# Patient Record
Sex: FEMALE | Race: BLACK OR AFRICAN AMERICAN | NOT HISPANIC OR LATINO | URBAN - METROPOLITAN AREA
[De-identification: names, ages, dates, MRNs, and addresses within clinical notes are randomized per-mention and may not be internally consistent; named-entity substitution may affect disease eponyms.]

---

## 2017-01-25 ENCOUNTER — EMERGENCY (EMERGENCY)
Facility: HOSPITAL | Age: 22
LOS: 1 days | Discharge: HOME | End: 2017-01-25
Admitting: EMERGENCY MEDICINE
Payer: MEDICAID

## 2017-01-25 DIAGNOSIS — Z98.89 OTHER SPECIFIED POSTPROCEDURAL STATES: Chronic | ICD-10-CM

## 2017-01-25 DIAGNOSIS — M75.100 UNSPECIFIED ROTATOR CUFF TEAR OR RUPTURE OF UNSPECIFIED SHOULDER, NOT SPECIFIED AS TRAUMATIC: Chronic | ICD-10-CM

## 2017-01-25 PROCEDURE — 76856 US EXAM PELVIC COMPLETE: CPT | Mod: 26

## 2017-01-25 PROCEDURE — 76830 TRANSVAGINAL US NON-OB: CPT | Mod: 26

## 2017-01-25 PROCEDURE — 99285 EMERGENCY DEPT VISIT HI MDM: CPT

## 2017-01-26 PROCEDURE — 85027 COMPLETE CBC AUTOMATED: CPT

## 2017-01-26 PROCEDURE — 36415 COLL VENOUS BLD VENIPUNCTURE: CPT

## 2017-01-26 PROCEDURE — 81003 URINALYSIS AUTO W/O SCOPE: CPT

## 2017-01-26 PROCEDURE — 87491 CHLMYD TRACH DNA AMP PROBE: CPT

## 2017-01-26 PROCEDURE — 87070 CULTURE OTHR SPECIMN AEROBIC: CPT

## 2017-01-26 PROCEDURE — 76830 TRANSVAGINAL US NON-OB: CPT

## 2017-01-26 PROCEDURE — 99284 EMERGENCY DEPT VISIT MOD MDM: CPT | Mod: 25

## 2017-01-26 PROCEDURE — 87086 URINE CULTURE/COLONY COUNT: CPT

## 2017-01-26 PROCEDURE — 76856 US EXAM PELVIC COMPLETE: CPT

## 2017-01-26 PROCEDURE — 81025 URINE PREGNANCY TEST: CPT

## 2017-01-26 PROCEDURE — 87591 N.GONORRHOEAE DNA AMP PROB: CPT

## 2017-01-26 PROCEDURE — 96372 THER/PROPH/DIAG INJ SC/IM: CPT

## 2017-03-07 ENCOUNTER — EMERGENCY (EMERGENCY)
Facility: HOSPITAL | Age: 22
LOS: 1 days | Discharge: HOME | End: 2017-03-07
Admitting: EMERGENCY MEDICINE
Payer: MEDICAID

## 2017-03-07 DIAGNOSIS — M75.100 UNSPECIFIED ROTATOR CUFF TEAR OR RUPTURE OF UNSPECIFIED SHOULDER, NOT SPECIFIED AS TRAUMATIC: Chronic | ICD-10-CM

## 2017-03-07 DIAGNOSIS — Z98.89 OTHER SPECIFIED POSTPROCEDURAL STATES: Chronic | ICD-10-CM

## 2017-03-07 PROCEDURE — 99284 EMERGENCY DEPT VISIT MOD MDM: CPT

## 2017-03-07 PROCEDURE — 96365 THER/PROPH/DIAG IV INF INIT: CPT

## 2017-03-07 PROCEDURE — 99284 EMERGENCY DEPT VISIT MOD MDM: CPT | Mod: 25

## 2017-03-21 ENCOUNTER — APPOINTMENT (OUTPATIENT)
Dept: NEUROLOGY | Facility: CLINIC | Age: 22
End: 2017-03-21

## 2017-12-04 ENCOUNTER — TRANSCRIPTION ENCOUNTER (OUTPATIENT)
Age: 22
End: 2017-12-04

## 2018-01-23 ENCOUNTER — APPOINTMENT (OUTPATIENT)
Dept: INTERNAL MEDICINE | Facility: CLINIC | Age: 23
End: 2018-01-23

## 2018-03-09 ENCOUNTER — TRANSCRIPTION ENCOUNTER (OUTPATIENT)
Age: 23
End: 2018-03-09

## 2018-03-09 ENCOUNTER — EMERGENCY (EMERGENCY)
Facility: HOSPITAL | Age: 23
LOS: 1 days | Discharge: HOME | End: 2018-03-09
Attending: EMERGENCY MEDICINE | Admitting: EMERGENCY MEDICINE
Payer: COMMERCIAL

## 2018-03-09 VITALS
TEMPERATURE: 98 F | OXYGEN SATURATION: 100 % | RESPIRATION RATE: 16 BRPM | SYSTOLIC BLOOD PRESSURE: 112 MMHG | DIASTOLIC BLOOD PRESSURE: 68 MMHG | HEART RATE: 94 BPM

## 2018-03-09 DIAGNOSIS — M75.100 UNSPECIFIED ROTATOR CUFF TEAR OR RUPTURE OF UNSPECIFIED SHOULDER, NOT SPECIFIED AS TRAUMATIC: Chronic | ICD-10-CM

## 2018-03-09 DIAGNOSIS — Z98.89 OTHER SPECIFIED POSTPROCEDURAL STATES: Chronic | ICD-10-CM

## 2018-03-09 PROCEDURE — 99285 EMERGENCY DEPT VISIT HI MDM: CPT | Mod: 25

## 2018-03-09 NOTE — ED ADULT TRIAGE NOTE - CHIEF COMPLAINT QUOTE
Pt presents to ED for ingesting approximately half a bottle of acetaminophen and half a bottle of Motrin and 3 pills of Tylenol 3 at approximately 12 pm today. Pt also co bilateral lower back pain and flank pain starting today that started after taking medication. Pt states she has been under increased amount of stress at home lately. Pt denies SI, denies HI, denies other drug or ETOH use, denies PMH. Pt calm and cooperative in triage. LMP 2/21/18. Pt transported to [facility] for constant observation.

## 2018-03-09 NOTE — ED PROVIDER NOTE - CARE PLAN
Principal Discharge DX:	Ingestion of substance  Assessment and plan of treatment:	Follow up with your primary care doctor within 1-2 days. Follow up with your therapist or with [facility]. Do not take acetaminophen or tylenol. Return to Emergency Department for any new, worsening, and/or concerning symptoms including but not limited to severe pain, thoughts of wanting to harm yourself or others.

## 2018-03-09 NOTE — ED PROVIDER NOTE - ATTENDING CONTRIBUTION TO CARE
22F p/w R sided flank pain x 1 day after taking a bunch of medicines around noon in a fit of pique due to a stressful life situation.  Pt somewhat evasive when asked if it was a suicide attempt but says it was not trying to kill herself.  She 12 tabs of 500mg tylenol.  6 tabls of ibu 200, 3 x t#3 in a possible suicide attempt.  Vomited 5 hr later, then 2 hrs later, then started having bilat LBP, more on the right.  Pt c/o nausea currently also.  Pt has history of psych hospitalization in the past.  Will rx NAC pending results of tylenol level and tox eval.  Given RUQ ttp would check RUQ US as well as check general labs, UHCG, UA, give fluids and zofran, reassess.  Potentially toxic ingestion of tylenol.  Psych eval.  VS:  unremarkable    GEN - NAD; malaise; A+O x3   HEAD - NC/AT     ENT - PEERL, EOMI, mucous membranes  dry , no discharge      NECK: Neck supple, non-tender without lymphadenopathy, no masses, no JVD  PULM - CTA b/l,  symmetric breath sounds  COR -  normal heart sounds    ABD - , ND, RUQ ttp, soft, no guarding, no rebound, no masses    BACK - no CVA tenderness, nontender spine     EXTREMS - no edema, no deformity, warm and well perfused    SKIN - no rash or bruising      NEUROLOGIC - alert, CN 2-12 intact, sensation nl, motor 5/5 RUE/LUE/RLE/LLE.

## 2018-03-09 NOTE — ED PROVIDER NOTE - OBJECTIVE STATEMENT
23 yo F presenting after taking approximately 12 tablets of acetaminophen 500 mg, approximately 6 tablets of ibuprofen 200 mg and 3 tablets of tylenol #3 because she was "under a lot of stress" at around 12 pm today. Denies wanting to harm herself at present. States she vomited approximately 5 hours after ingestion, which had "taste of pills" but no pill fragments, then again had NBNB approx 1 hour prior to arrival. Denies nausea at present but states she is having bilateral lower back pain which began about 6 hours post ingestion. 23 yo F presenting after taking approximately 12 tablets of acetaminophen 500 mg, approximately 6 tablets of ibuprofen 200 mg and 3 tablets of tylenol #3 because she was "under a lot of stress" at around 12 pm today. Denies wanting to harm herself at present. States she vomited approximately 5 hours after ingestion, which had "taste of pills" but no pill fragments, then again had NBNB approx 1 hour prior to arrival. Denies nausea at present but states she is having bilateral lower back pain which began about 6 hours post ingestion. LMP 2/21

## 2018-03-09 NOTE — ED PROVIDER NOTE - PROGRESS NOTE DETAILS
Francisco PGY4: Patient reports pain improved. Cleared by psychiatry. Repeat acetaminophen level less than 15. CMP with normal LFTs. Patient given copy of results and info for [facility] crisis center. Stable for discharge. Patient informed of low K. States potassium always low. Declining KCl tablet here, states she will go home and eat bananas.

## 2018-03-09 NOTE — ED PROVIDER NOTE - NOTES
Spoke to Toxicology fellow Dr. Billingsley who stated that given normal LFTs and 12 hour acetaminophen level 22.2, would not need NAC. NAC discontinued.

## 2018-03-09 NOTE — ED PROVIDER NOTE - PLAN OF CARE
Follow up with your primary care doctor within 1-2 days. Follow up with your therapist or with [facility]. Do not take acetaminophen or tylenol. Return to Emergency Department for any new, worsening, and/or concerning symptoms including but not limited to severe pain, thoughts of wanting to harm yourself or others.

## 2018-03-10 VITALS
DIASTOLIC BLOOD PRESSURE: 87 MMHG | SYSTOLIC BLOOD PRESSURE: 116 MMHG | OXYGEN SATURATION: 100 % | RESPIRATION RATE: 18 BRPM | HEART RATE: 78 BPM

## 2018-03-10 DIAGNOSIS — R69 ILLNESS, UNSPECIFIED: ICD-10-CM

## 2018-03-10 DIAGNOSIS — F43.20 ADJUSTMENT DISORDER, UNSPECIFIED: ICD-10-CM

## 2018-03-10 LAB
ALBUMIN SERPL ELPH-MCNC: 3.9 G/DL — SIGNIFICANT CHANGE UP (ref 3.3–5)
ALBUMIN SERPL ELPH-MCNC: 4.4 G/DL — SIGNIFICANT CHANGE UP (ref 3.3–5)
ALP SERPL-CCNC: 39 U/L — LOW (ref 40–120)
ALP SERPL-CCNC: 46 U/L — SIGNIFICANT CHANGE UP (ref 40–120)
ALT FLD-CCNC: 11 U/L — SIGNIFICANT CHANGE UP (ref 4–33)
ALT FLD-CCNC: 9 U/L — SIGNIFICANT CHANGE UP (ref 4–33)
APAP SERPL-MCNC: 22.2 UG/ML — SIGNIFICANT CHANGE UP (ref 15–25)
APAP SERPL-MCNC: < 15 UG/ML — LOW (ref 15–25)
AST SERPL-CCNC: 15 U/L — SIGNIFICANT CHANGE UP (ref 4–32)
AST SERPL-CCNC: 18 U/L — SIGNIFICANT CHANGE UP (ref 4–32)
BARBITURATES MEASUREMENT: NEGATIVE — SIGNIFICANT CHANGE UP
BARBITURATES MEASUREMENT: NEGATIVE — SIGNIFICANT CHANGE UP
BASE EXCESS BLDV CALC-SCNC: 1.5 MMOL/L — SIGNIFICANT CHANGE UP
BASOPHILS # BLD AUTO: 0.01 K/UL — SIGNIFICANT CHANGE UP (ref 0–0.2)
BASOPHILS NFR BLD AUTO: 0.2 % — SIGNIFICANT CHANGE UP (ref 0–2)
BENZODIAZ SERPL-MCNC: NEGATIVE — SIGNIFICANT CHANGE UP
BENZODIAZ SERPL-MCNC: NEGATIVE — SIGNIFICANT CHANGE UP
BILIRUB SERPL-MCNC: 0.9 MG/DL — SIGNIFICANT CHANGE UP (ref 0.2–1.2)
BILIRUB SERPL-MCNC: 1 MG/DL — SIGNIFICANT CHANGE UP (ref 0.2–1.2)
BLOOD GAS VENOUS - CREATININE: 0.71 MG/DL — SIGNIFICANT CHANGE UP (ref 0.5–1.3)
BUN SERPL-MCNC: 10 MG/DL — SIGNIFICANT CHANGE UP (ref 7–23)
BUN SERPL-MCNC: 12 MG/DL — SIGNIFICANT CHANGE UP (ref 7–23)
CALCIUM SERPL-MCNC: 8.2 MG/DL — LOW (ref 8.4–10.5)
CALCIUM SERPL-MCNC: 8.9 MG/DL — SIGNIFICANT CHANGE UP (ref 8.4–10.5)
CHLORIDE BLDV-SCNC: 106 MMOL/L — SIGNIFICANT CHANGE UP (ref 96–108)
CHLORIDE SERPL-SCNC: 105 MMOL/L — SIGNIFICANT CHANGE UP (ref 98–107)
CHLORIDE SERPL-SCNC: 106 MMOL/L — SIGNIFICANT CHANGE UP (ref 98–107)
CK SERPL-CCNC: 133 U/L — SIGNIFICANT CHANGE UP (ref 25–170)
CO2 SERPL-SCNC: 22 MMOL/L — SIGNIFICANT CHANGE UP (ref 22–31)
CO2 SERPL-SCNC: 23 MMOL/L — SIGNIFICANT CHANGE UP (ref 22–31)
CREAT SERPL-MCNC: 0.56 MG/DL — SIGNIFICANT CHANGE UP (ref 0.5–1.3)
CREAT SERPL-MCNC: 0.76 MG/DL — SIGNIFICANT CHANGE UP (ref 0.5–1.3)
EOSINOPHIL # BLD AUTO: 0.05 K/UL — SIGNIFICANT CHANGE UP (ref 0–0.5)
EOSINOPHIL NFR BLD AUTO: 1.1 % — SIGNIFICANT CHANGE UP (ref 0–6)
ETHANOL BLD-MCNC: < 10 MG/DL — SIGNIFICANT CHANGE UP
ETHANOL BLD-MCNC: < 10 MG/DL — SIGNIFICANT CHANGE UP
GAS PNL BLDV: 140 MMOL/L — SIGNIFICANT CHANGE UP (ref 136–146)
GLUCOSE BLDV-MCNC: 94 — SIGNIFICANT CHANGE UP (ref 70–99)
GLUCOSE SERPL-MCNC: 90 MG/DL — SIGNIFICANT CHANGE UP (ref 70–99)
GLUCOSE SERPL-MCNC: 91 MG/DL — SIGNIFICANT CHANGE UP (ref 70–99)
HCO3 BLDV-SCNC: 24 MMOL/L — SIGNIFICANT CHANGE UP (ref 20–27)
HCT VFR BLD CALC: 39.2 % — SIGNIFICANT CHANGE UP (ref 34.5–45)
HCT VFR BLDV CALC: 41.6 % — SIGNIFICANT CHANGE UP (ref 34.5–45)
HGB BLD-MCNC: 13.4 G/DL — SIGNIFICANT CHANGE UP (ref 11.5–15.5)
HGB BLDV-MCNC: 13.6 G/DL — SIGNIFICANT CHANGE UP (ref 11.5–15.5)
IMM GRANULOCYTES # BLD AUTO: 0.01 # — SIGNIFICANT CHANGE UP
IMM GRANULOCYTES NFR BLD AUTO: 0.2 % — SIGNIFICANT CHANGE UP (ref 0–1.5)
LACTATE BLDV-MCNC: 1.5 MMOL/L — SIGNIFICANT CHANGE UP (ref 0.5–2)
LYMPHOCYTES # BLD AUTO: 1.43 K/UL — SIGNIFICANT CHANGE UP (ref 1–3.3)
LYMPHOCYTES # BLD AUTO: 31.2 % — SIGNIFICANT CHANGE UP (ref 13–44)
MAGNESIUM SERPL-MCNC: 2.2 MG/DL — SIGNIFICANT CHANGE UP (ref 1.6–2.6)
MCHC RBC-ENTMCNC: 32.4 PG — SIGNIFICANT CHANGE UP (ref 27–34)
MCHC RBC-ENTMCNC: 34.2 % — SIGNIFICANT CHANGE UP (ref 32–36)
MCV RBC AUTO: 94.7 FL — SIGNIFICANT CHANGE UP (ref 80–100)
MONOCYTES # BLD AUTO: 0.31 K/UL — SIGNIFICANT CHANGE UP (ref 0–0.9)
MONOCYTES NFR BLD AUTO: 6.8 % — SIGNIFICANT CHANGE UP (ref 2–14)
NEUTROPHILS # BLD AUTO: 2.78 K/UL — SIGNIFICANT CHANGE UP (ref 1.8–7.4)
NEUTROPHILS NFR BLD AUTO: 60.5 % — SIGNIFICANT CHANGE UP (ref 43–77)
NRBC # FLD: 0 — SIGNIFICANT CHANGE UP
PCO2 BLDV: 50 MMHG — SIGNIFICANT CHANGE UP (ref 41–51)
PH BLDV: 7.34 PH — SIGNIFICANT CHANGE UP (ref 7.32–7.43)
PHOSPHATE SERPL-MCNC: 3.7 MG/DL — SIGNIFICANT CHANGE UP (ref 2.5–4.5)
PLATELET # BLD AUTO: 214 K/UL — SIGNIFICANT CHANGE UP (ref 150–400)
PMV BLD: 11.2 FL — SIGNIFICANT CHANGE UP (ref 7–13)
PO2 BLDV: 31 MMHG — LOW (ref 35–40)
POTASSIUM BLDV-SCNC: 3.2 MMOL/L — LOW (ref 3.4–4.5)
POTASSIUM SERPL-MCNC: 3.3 MMOL/L — LOW (ref 3.5–5.3)
POTASSIUM SERPL-MCNC: 3.4 MMOL/L — LOW (ref 3.5–5.3)
POTASSIUM SERPL-SCNC: 3.3 MMOL/L — LOW (ref 3.5–5.3)
POTASSIUM SERPL-SCNC: 3.4 MMOL/L — LOW (ref 3.5–5.3)
PROT SERPL-MCNC: 6.9 G/DL — SIGNIFICANT CHANGE UP (ref 6–8.3)
PROT SERPL-MCNC: 7.8 G/DL — SIGNIFICANT CHANGE UP (ref 6–8.3)
RBC # BLD: 4.14 M/UL — SIGNIFICANT CHANGE UP (ref 3.8–5.2)
RBC # FLD: 12.8 % — SIGNIFICANT CHANGE UP (ref 10.3–14.5)
SALICYLATES SERPL-MCNC: < 5 MG/DL — LOW (ref 15–30)
SALICYLATES SERPL-MCNC: < 5 MG/DL — LOW (ref 15–30)
SAO2 % BLDV: 50.7 % — LOW (ref 60–85)
SODIUM SERPL-SCNC: 142 MMOL/L — SIGNIFICANT CHANGE UP (ref 135–145)
SODIUM SERPL-SCNC: 143 MMOL/L — SIGNIFICANT CHANGE UP (ref 135–145)
WBC # BLD: 4.59 K/UL — SIGNIFICANT CHANGE UP (ref 3.8–10.5)
WBC # FLD AUTO: 4.59 K/UL — SIGNIFICANT CHANGE UP (ref 3.8–10.5)

## 2018-03-10 PROCEDURE — 90792 PSYCH DIAG EVAL W/MED SRVCS: CPT

## 2018-03-10 PROCEDURE — 76705 ECHO EXAM OF ABDOMEN: CPT | Mod: 26

## 2018-03-10 RX ORDER — SODIUM CHLORIDE 9 MG/ML
1000 INJECTION INTRAMUSCULAR; INTRAVENOUS; SUBCUTANEOUS ONCE
Qty: 0 | Refills: 0 | Status: COMPLETED | OUTPATIENT
Start: 2018-03-10 | End: 2018-03-10

## 2018-03-10 RX ORDER — ACETYLCYSTEINE 200 MG/ML
10 VIAL (ML) MISCELLANEOUS ONCE
Qty: 0 | Refills: 0 | Status: DISCONTINUED | OUTPATIENT
Start: 2018-03-10 | End: 2018-03-10

## 2018-03-10 RX ORDER — ONDANSETRON 8 MG/1
4 TABLET, FILM COATED ORAL ONCE
Qty: 0 | Refills: 0 | Status: COMPLETED | OUTPATIENT
Start: 2018-03-10 | End: 2018-03-10

## 2018-03-10 RX ORDER — POTASSIUM CHLORIDE 20 MEQ
40 PACKET (EA) ORAL ONCE
Qty: 0 | Refills: 0 | Status: DISCONTINUED | OUTPATIENT
Start: 2018-03-10 | End: 2018-03-10

## 2018-03-10 RX ADMIN — ONDANSETRON 4 MILLIGRAM(S): 8 TABLET, FILM COATED ORAL at 01:27

## 2018-03-10 RX ADMIN — SODIUM CHLORIDE 1000 MILLILITER(S): 9 INJECTION INTRAMUSCULAR; INTRAVENOUS; SUBCUTANEOUS at 01:27

## 2018-03-10 NOTE — ED ADULT NURSE REASSESSMENT NOTE - NS ED NURSE REASSESS COMMENT FT1
pt aox3; in no acute distress at this time VSS, 1:1 in place. Safety maintained. Will continue to monitor/assess

## 2018-03-10 NOTE — ED ADULT NURSE NOTE - OBJECTIVE STATEMENT
pt aox3, reports taking approximately 12 tablets of acetaminophen 500 mg, approximately 6 tablets of ibuprofen 200 mg, and 3 tablets of tylenol #3 around 12 pm today. pt states her reason for taking medications was because she was "under a lot of stress" Reports lower back pain, denies nausea at this time. States she vomited 5 hours after ingestion.  Pt presents in no acute distress at this time, denies thoughts of hurting herself or others.  VSS iv placed in right ac 20g; labs sent. 1:1 in place for safety. Will continue to monitor/assess

## 2018-03-10 NOTE — ED ADULT NURSE NOTE - CHIEF COMPLAINT QUOTE
Pt presents to ED for ingesting approximately half a bottle of acetaminophen and half a bottle of Motrin and 3 pills of Tylenol 3 at approximately 12 pm today. Pt also co bilateral lower back pain and flank pain starting today that started after taking medication. Pt states she has been under increased amount of stress at home lately. Pt denies SI, denies HI, denies other drug or ETOH use, denies PMH. Pt calm and cooperative in triage. LMP 2/21/18. Pt transported to [facility] for constant observation.

## 2018-03-10 NOTE — ED BEHAVIORAL HEALTH ASSESSMENT NOTE - HPI (INCLUDE ILLNESS QUALITY, SEVERITY, DURATION, TIMING, CONTEXT, MODIFYING FACTORS, ASSOCIATED SIGNS AND SYMPTOMS)
23 y/o female domiciled with mother and 1 y/o son, works for [employer], with history of Mood Disorder NOS per CVM, 1 past psych hospitalization at [facility] in 2008 s/p suicide attempt via overdose on Tylenol and Motrin, ED visit in 2012 s/p overdose on 6 pills of Augmentin, recently in therapy, not on psychotropics at this time, self-referred brought in by boyfriend s/p suicidal gesture via overdose on approximately 12 tablets of acetaminophen 500 mg, approximately 6 tablets of ibuprofen 200 mg and 3 tablets of tylenol #3 because she was "under a lot of stress."    On assessment, pt reports taking the above amount of Acetaminophen, Ibuprofen, and Tylenol #3 at 12:00 PM today because of ongoing stress. She denies any acute triggers today. States the overdose was impulsive and she regrets doing it, especially because she has a 1 y/o son. Pt was alone when she did it but immediately contacted her boyfriend after taking the pills to tell him what she did. She asked him to take her to the hospital. States she took the pills as a "cry for help" because of the stress she has been experiencing. She adamantly denies wanting to kill herself. She previously worked in a pharmacy and states she knew this amount of medication wouldn't kill her.   Pt cites school, work, custody travis as her primary stressors. She recently graduated from [employer] and plans to apply to 4 year colleges, but she doesn't know where to apply yet or what she wants to do. States her job is stressful, and it was especially stressful last week because she worked a lot of overtime. She is also involved in a custody travis with her son's father. Pt reports feeling "stressed," but she denies depressed mood or anhedonia. She reports difficulty sleeping the past few nights but reports fair sleep overall. She reports good appetite and good energy. She denies difficulty concentrating. She denies anhedonia, denies hopelessness. She adamantly denies suicidal ideation, intent, or plan. She denies homicidal or violent ideation, intent, or plan. She denies manic or psychotic symptoms. She reports occasional alcohol use. She denies illicit drug use.  Pt was seeing a therapist but hasn't seen her in 3 weeks because of pt's work schedule. Pt found therapy helpful and wants to resume. She plans to schedule an appointment with a therapist (Tiffanie Eckert) she has seen in the past and is available on weekends.     Collateral obtained from pt's boyfriend, Delfino. He corroborates the story pt told writer. After the overdose, she immediately expressed remorse and asked him to take her to the hospital. She denied wanting to kill herself. Pt has not voiced SI/HI. Informant denies acute safety concerns and feels comfortable with pt returning home.

## 2018-03-10 NOTE — ED BEHAVIORAL HEALTH ASSESSMENT NOTE - SUMMARY
23 y/o female domiciled with mother and 1 y/o son, works for [employer], with history of Mood Disorder NOS per CVM, 1 past psych hospitalization at [facility] in 2008 s/p suicide attempt via overdose on Tylenol and Motrin, ED visit in 2012 s/p overdose on 6 pills of Augmentin, recently in therapy, not on psychotropics at this time, self-referred brought in by boyfriend s/p suicidal gesture via overdose on approximately 12 tablets of acetaminophen 500 mg, approximately 6 tablets of ibuprofen 200 mg and 3 tablets of tylenol #3 because she was "under a lot of stress."    Patient impulsively took pills as a "cry for help" in the context of psychosocial stressors. Pt expresses remorse and adamantly denies this was a suicide attempt. She denies suicidal ideation, intent, or plan. She is future-oriented and engages in safety planning. She denies homicidal or violent ideation, intent, or plan. She does not appear manic, psychotic, or depressed. Collateral informant denies acute safety concerns and feels comfortable with pt returning home.   Pt does not present an acute danger to self or others, and she does not meet criteria for involuntary psychiatric admission at this time. She declines voluntary psychiatric admission.

## 2018-03-10 NOTE — ED BEHAVIORAL HEALTH ASSESSMENT NOTE - REFERRAL / APPOINTMENT DETAILS
follow-up with therapist, Tiffanie Eckert; pt provided with information for [facility]

## 2018-03-10 NOTE — ED BEHAVIORAL HEALTH ASSESSMENT NOTE - DESCRIPTION
calm, cooperative, in good behavioral control     Vital Signs Last 24 Hrs  T(C): 36.8 (10 Mar 2018 02:56), Max: 36.8 (10 Mar 2018 02:56)  T(F): 98.3 (10 Mar 2018 02:56), Max: 98.3 (10 Mar 2018 02:56)  HR: 81 (10 Mar 2018 02:56) (81 - 94)  BP: 127/71 (10 Mar 2018 02:56) (112/68 - 127/71)  BP(mean): --  RR: 18 (10 Mar 2018 02:56) (16 - 18)  SpO2: 100% (10 Mar 2018 02:56) (100% - 100%) denies see HPI

## 2018-03-10 NOTE — ED BEHAVIORAL HEALTH ASSESSMENT NOTE - DIFFERENTIAL
unspecified adjustment disorder  unspecified mood disorder  unspecified anxiety disorder  borderline traits

## 2018-03-16 ENCOUNTER — OUTPATIENT (OUTPATIENT)
Dept: OUTPATIENT SERVICES | Facility: HOSPITAL | Age: 23
LOS: 1 days | Discharge: STILL ADMITTED OR ADMINISTRATIVE | End: 2018-03-16

## 2018-03-16 DIAGNOSIS — Z98.89 OTHER SPECIFIED POSTPROCEDURAL STATES: Chronic | ICD-10-CM

## 2018-03-16 DIAGNOSIS — M75.100 UNSPECIFIED ROTATOR CUFF TEAR OR RUPTURE OF UNSPECIFIED SHOULDER, NOT SPECIFIED AS TRAUMATIC: Chronic | ICD-10-CM

## 2018-03-16 DIAGNOSIS — F43.20 ADJUSTMENT DISORDER, UNSPECIFIED: ICD-10-CM

## 2018-04-01 ENCOUNTER — OUTPATIENT (OUTPATIENT)
Dept: OUTPATIENT SERVICES | Facility: HOSPITAL | Age: 23
LOS: 1 days | End: 2018-04-01
Payer: MEDICAID

## 2018-04-01 DIAGNOSIS — Z98.89 OTHER SPECIFIED POSTPROCEDURAL STATES: Chronic | ICD-10-CM

## 2018-04-01 DIAGNOSIS — M75.100 UNSPECIFIED ROTATOR CUFF TEAR OR RUPTURE OF UNSPECIFIED SHOULDER, NOT SPECIFIED AS TRAUMATIC: Chronic | ICD-10-CM

## 2018-04-01 PROCEDURE — G9001: CPT

## 2018-04-11 ENCOUNTER — EMERGENCY (EMERGENCY)
Facility: HOSPITAL | Age: 23
LOS: 1 days | Discharge: HOME | End: 2018-04-11
Attending: EMERGENCY MEDICINE | Admitting: EMERGENCY MEDICINE
Payer: COMMERCIAL

## 2018-04-11 VITALS
DIASTOLIC BLOOD PRESSURE: 68 MMHG | OXYGEN SATURATION: 100 % | RESPIRATION RATE: 18 BRPM | HEART RATE: 92 BPM | SYSTOLIC BLOOD PRESSURE: 120 MMHG | TEMPERATURE: 98 F

## 2018-04-11 DIAGNOSIS — Z98.89 OTHER SPECIFIED POSTPROCEDURAL STATES: Chronic | ICD-10-CM

## 2018-04-11 DIAGNOSIS — M75.100 UNSPECIFIED ROTATOR CUFF TEAR OR RUPTURE OF UNSPECIFIED SHOULDER, NOT SPECIFIED AS TRAUMATIC: Chronic | ICD-10-CM

## 2018-04-11 PROCEDURE — 71046 X-RAY EXAM CHEST 2 VIEWS: CPT | Mod: 26

## 2018-04-11 PROCEDURE — 99284 EMERGENCY DEPT VISIT MOD MDM: CPT

## 2018-04-11 RX ORDER — SODIUM CHLORIDE 9 MG/ML
1000 INJECTION INTRAMUSCULAR; INTRAVENOUS; SUBCUTANEOUS ONCE
Qty: 0 | Refills: 0 | Status: COMPLETED | OUTPATIENT
Start: 2018-04-11 | End: 2018-04-11

## 2018-04-11 RX ADMIN — SODIUM CHLORIDE 1000 MILLILITER(S): 9 INJECTION INTRAMUSCULAR; INTRAVENOUS; SUBCUTANEOUS at 13:58

## 2018-04-11 NOTE — ED PROVIDER NOTE - CARE PLAN
Principal Discharge DX:	Palpitations  Secondary Diagnosis:	Chest pain  Secondary Diagnosis:	Wheeze Principal Discharge DX:	Palpitations  Assessment and plan of treatment:	You were seen today for your palpitations.  It is unclear what the cause is at this time.  Avoid caffeine, alcohol, and stay well hydrated.  Be sure to follow up with your primary care physician in 2-3 days for re-evaluation.  RETURN TO THE EMERGENCY DEPARTMENT IMMEDIATELY IF YOU DEVELOP CHEST PAIN, TROUBLE BREATHING, IF YOU PASS OUT, OR FOR ANY OTHER CONCERN.  Secondary Diagnosis:	Chest pain  Secondary Diagnosis:	Wheeze

## 2018-04-11 NOTE — ED PROVIDER NOTE - OBJECTIVE STATEMENT
21 y/o female with c/o palpitations.  Per pt, she developed palpitations last night.  No prior h/o such sx.  Intermittent since that time.  She has left sided chest discomfort radiating to the left shoulder.  Aching in nature.  No h/o dvt/pe, recent long immob / surgery / long travel / leg pain / leg swelling / ocp use.  No fam h/o early cardiac disease.  Denies smoking, htn, hld, dm. 23 y/o female with c/o palpitations.  Per pt, she developed palpitations last night.  No prior h/o such sx.  Intermittent since that time.  She has left sided chest discomfort radiating to the left shoulder.  Aching in nature.  No h/o dvt/pe, recent long immob / surgery / long travel / leg pain / leg swelling / ocp use.  No fam h/o early cardiac disease.  Denies smoking, htn, hld, dm, illegal drug use.

## 2018-04-11 NOTE — ED PROVIDER NOTE - CARDIAC, MLM
Normal rate, regular rhythm.  Heart sounds S1, S2.  No murmurs, rubs or gallops.  TTP over left parasternal region and left anterior shoulder, no rash or crepitus

## 2018-04-11 NOTE — ED PROVIDER NOTE - PROGRESS NOTE DETAILS
MD PEARSON:  Pt declines to wait for her chemistry to result.  Prefers to f/u with her pcp and see cardiologist as outpatient.  I have printed her cbc and cxr results for her.  She agrees to return immediately for any worsening symptoms.

## 2018-04-11 NOTE — ED PROVIDER NOTE - MEDICAL DECISION MAKING DETAILS
23 y/o female with palpitations, chest pain since last night.  EKG wnl, nsr, no st elevations or depressions, no t wave inversions, no prolonged qtc, no heart block, no delta or epsilon waves, no lvh.  Doubt acs as no rf's, doubt pe as low risk by wells perc neg, consider anemia, dehydration, lyte abn, hyper thyroidism, pregnancy, ptx.  Obtain cbc, bmp, tsh, cxr, give ivf bolus, reassess, antic dc with o/p f/u.

## 2018-04-11 NOTE — ED ADULT NURSE NOTE - OBJECTIVE STATEMENT
Pt presents to intake room 8, A&Ox3, ambulatory at baseline without assistance, coming in for evaluation of palpitations and shortness of breath since last night. pt woke up with left shoulder pain with numbness and tingling radiating into her fingers.  Denies any chest pain, dizziness, nausea, vomiting, diarrhea, fever, constipation, or chills. IV established in right ac with a 20g, labs drawn and sent, call bell in reach, side rails up, bed in locked position, md evaluation in progress, will continue to monitor. pt allergic to Tegaderm tape, iv secured with paper tape chevron and secured with gauze.

## 2018-04-16 DIAGNOSIS — R69 ILLNESS, UNSPECIFIED: ICD-10-CM

## 2018-05-10 ENCOUNTER — APPOINTMENT (OUTPATIENT)
Dept: ORTHOPEDIC SURGERY | Facility: CLINIC | Age: 23
End: 2018-05-10
Payer: COMMERCIAL

## 2018-05-10 VITALS
HEIGHT: 65 IN | HEART RATE: 93 BPM | DIASTOLIC BLOOD PRESSURE: 83 MMHG | WEIGHT: 138 LBS | SYSTOLIC BLOOD PRESSURE: 117 MMHG | BODY MASS INDEX: 22.99 KG/M2

## 2018-05-10 DIAGNOSIS — Z86.79 PERSONAL HISTORY OF OTHER DISEASES OF THE CIRCULATORY SYSTEM: ICD-10-CM

## 2018-05-10 DIAGNOSIS — Z78.9 OTHER SPECIFIED HEALTH STATUS: ICD-10-CM

## 2018-05-10 DIAGNOSIS — Z87.09 PERSONAL HISTORY OF OTHER DISEASES OF THE RESPIRATORY SYSTEM: ICD-10-CM

## 2018-05-10 PROCEDURE — 72100 X-RAY EXAM L-S SPINE 2/3 VWS: CPT

## 2018-05-10 PROCEDURE — 99204 OFFICE O/P NEW MOD 45 MIN: CPT

## 2018-06-05 ENCOUNTER — APPOINTMENT (OUTPATIENT)
Dept: ORTHOPEDIC SURGERY | Facility: CLINIC | Age: 23
End: 2018-06-05
Payer: COMMERCIAL

## 2018-06-05 ENCOUNTER — APPOINTMENT (OUTPATIENT)
Dept: SURGICAL ONCOLOGY | Facility: CLINIC | Age: 23
End: 2018-06-05
Payer: COMMERCIAL

## 2018-06-05 VITALS
SYSTOLIC BLOOD PRESSURE: 115 MMHG | DIASTOLIC BLOOD PRESSURE: 73 MMHG | RESPIRATION RATE: 16 BRPM | HEIGHT: 65 IN | WEIGHT: 138 LBS | HEART RATE: 98 BPM | OXYGEN SATURATION: 99 % | BODY MASS INDEX: 22.99 KG/M2

## 2018-06-05 DIAGNOSIS — G89.29 LUMBAGO WITH SCIATICA, LEFT SIDE: ICD-10-CM

## 2018-06-05 DIAGNOSIS — M54.42 LUMBAGO WITH SCIATICA, LEFT SIDE: ICD-10-CM

## 2018-06-05 DIAGNOSIS — M54.16 RADICULOPATHY, LUMBAR REGION: ICD-10-CM

## 2018-06-05 PROCEDURE — 99204 OFFICE O/P NEW MOD 45 MIN: CPT

## 2018-06-05 PROCEDURE — 99214 OFFICE O/P EST MOD 30 MIN: CPT

## 2018-06-26 ENCOUNTER — APPOINTMENT (OUTPATIENT)
Dept: ORTHOPEDIC SURGERY | Facility: CLINIC | Age: 23
End: 2018-06-26

## 2018-08-06 ENCOUNTER — INPATIENT (INPATIENT)
Facility: HOSPITAL | Age: 23
LOS: 2 days | Discharge: HOME | End: 2018-08-09
Attending: PSYCHIATRY & NEUROLOGY | Admitting: PSYCHIATRY & NEUROLOGY
Payer: COMMERCIAL

## 2018-08-06 VITALS
SYSTOLIC BLOOD PRESSURE: 137 MMHG | RESPIRATION RATE: 18 BRPM | HEART RATE: 120 BPM | OXYGEN SATURATION: 100 % | TEMPERATURE: 99 F | DIASTOLIC BLOOD PRESSURE: 95 MMHG

## 2018-08-06 DIAGNOSIS — Z98.89 OTHER SPECIFIED POSTPROCEDURAL STATES: Chronic | ICD-10-CM

## 2018-08-06 DIAGNOSIS — F32.9 MAJOR DEPRESSIVE DISORDER, SINGLE EPISODE, UNSPECIFIED: ICD-10-CM

## 2018-08-06 DIAGNOSIS — F33.2 MAJOR DEPRESSIVE DISORDER, RECURRENT SEVERE WITHOUT PSYCHOTIC FEATURES: ICD-10-CM

## 2018-08-06 DIAGNOSIS — M75.100 UNSPECIFIED ROTATOR CUFF TEAR OR RUPTURE OF UNSPECIFIED SHOULDER, NOT SPECIFIED AS TRAUMATIC: Chronic | ICD-10-CM

## 2018-08-06 LAB
ALBUMIN SERPL ELPH-MCNC: 4.3 G/DL — SIGNIFICANT CHANGE UP (ref 3.3–5)
ALP SERPL-CCNC: 48 U/L — SIGNIFICANT CHANGE UP (ref 40–120)
ALT FLD-CCNC: 11 U/L — SIGNIFICANT CHANGE UP (ref 4–33)
AMPHET UR-MCNC: NEGATIVE — SIGNIFICANT CHANGE UP
APAP SERPL-MCNC: < 15 UG/ML — LOW (ref 15–25)
APPEARANCE UR: CLEAR — SIGNIFICANT CHANGE UP
AST SERPL-CCNC: 23 U/L — SIGNIFICANT CHANGE UP (ref 4–32)
BARBITURATES UR SCN-MCNC: NEGATIVE — SIGNIFICANT CHANGE UP
BASOPHILS # BLD AUTO: 0.01 K/UL — SIGNIFICANT CHANGE UP (ref 0–0.2)
BASOPHILS NFR BLD AUTO: 0.2 % — SIGNIFICANT CHANGE UP (ref 0–2)
BENZODIAZ UR-MCNC: NEGATIVE — SIGNIFICANT CHANGE UP
BILIRUB SERPL-MCNC: 0.5 MG/DL — SIGNIFICANT CHANGE UP (ref 0.2–1.2)
BILIRUB UR-MCNC: NEGATIVE — SIGNIFICANT CHANGE UP
BLOOD UR QL VISUAL: NEGATIVE — SIGNIFICANT CHANGE UP
BUN SERPL-MCNC: 10 MG/DL — SIGNIFICANT CHANGE UP (ref 7–23)
CALCIUM SERPL-MCNC: 10 MG/DL — SIGNIFICANT CHANGE UP (ref 8.4–10.5)
CANNABINOIDS UR-MCNC: NEGATIVE — SIGNIFICANT CHANGE UP
CHLORIDE SERPL-SCNC: 102 MMOL/L — SIGNIFICANT CHANGE UP (ref 98–107)
CO2 SERPL-SCNC: 24 MMOL/L — SIGNIFICANT CHANGE UP (ref 22–31)
COCAINE METAB.OTHER UR-MCNC: NEGATIVE — SIGNIFICANT CHANGE UP
COLOR SPEC: YELLOW — SIGNIFICANT CHANGE UP
CREAT SERPL-MCNC: 0.78 MG/DL — SIGNIFICANT CHANGE UP (ref 0.5–1.3)
EOSINOPHIL # BLD AUTO: 0.05 K/UL — SIGNIFICANT CHANGE UP (ref 0–0.5)
EOSINOPHIL NFR BLD AUTO: 0.9 % — SIGNIFICANT CHANGE UP (ref 0–6)
ETHANOL BLD-MCNC: < 10 MG/DL — SIGNIFICANT CHANGE UP
GLUCOSE SERPL-MCNC: 88 MG/DL — SIGNIFICANT CHANGE UP (ref 70–99)
GLUCOSE UR-MCNC: NEGATIVE — SIGNIFICANT CHANGE UP
HCG SERPL-ACNC: < 5 MIU/ML — SIGNIFICANT CHANGE UP
HCT VFR BLD CALC: 38.9 % — SIGNIFICANT CHANGE UP (ref 34.5–45)
HGB BLD-MCNC: 12.8 G/DL — SIGNIFICANT CHANGE UP (ref 11.5–15.5)
IMM GRANULOCYTES # BLD AUTO: 0.01 # — SIGNIFICANT CHANGE UP
IMM GRANULOCYTES NFR BLD AUTO: 0.2 % — SIGNIFICANT CHANGE UP (ref 0–1.5)
KETONES UR-MCNC: SIGNIFICANT CHANGE UP
LEUKOCYTE ESTERASE UR-ACNC: NEGATIVE — SIGNIFICANT CHANGE UP
LYMPHOCYTES # BLD AUTO: 1.12 K/UL — SIGNIFICANT CHANGE UP (ref 1–3.3)
LYMPHOCYTES # BLD AUTO: 20.6 % — SIGNIFICANT CHANGE UP (ref 13–44)
MCHC RBC-ENTMCNC: 30.5 PG — SIGNIFICANT CHANGE UP (ref 27–34)
MCHC RBC-ENTMCNC: 32.9 % — SIGNIFICANT CHANGE UP (ref 32–36)
MCV RBC AUTO: 92.8 FL — SIGNIFICANT CHANGE UP (ref 80–100)
METHADONE UR-MCNC: NEGATIVE — SIGNIFICANT CHANGE UP
MONOCYTES # BLD AUTO: 0.36 K/UL — SIGNIFICANT CHANGE UP (ref 0–0.9)
MONOCYTES NFR BLD AUTO: 6.6 % — SIGNIFICANT CHANGE UP (ref 2–14)
MUCOUS THREADS # UR AUTO: SIGNIFICANT CHANGE UP
NEUTROPHILS # BLD AUTO: 3.9 K/UL — SIGNIFICANT CHANGE UP (ref 1.8–7.4)
NEUTROPHILS NFR BLD AUTO: 71.5 % — SIGNIFICANT CHANGE UP (ref 43–77)
NITRITE UR-MCNC: NEGATIVE — SIGNIFICANT CHANGE UP
NRBC # FLD: 0 — SIGNIFICANT CHANGE UP
OPIATES UR-MCNC: NEGATIVE — SIGNIFICANT CHANGE UP
OXYCODONE UR-MCNC: NEGATIVE — SIGNIFICANT CHANGE UP
PCP UR-MCNC: NEGATIVE — SIGNIFICANT CHANGE UP
PH UR: 7.5 — SIGNIFICANT CHANGE UP (ref 4.6–8)
PLATELET # BLD AUTO: 227 K/UL — SIGNIFICANT CHANGE UP (ref 150–400)
PMV BLD: 11.6 FL — SIGNIFICANT CHANGE UP (ref 7–13)
POTASSIUM SERPL-MCNC: 4.2 MMOL/L — SIGNIFICANT CHANGE UP (ref 3.5–5.3)
POTASSIUM SERPL-SCNC: 4.2 MMOL/L — SIGNIFICANT CHANGE UP (ref 3.5–5.3)
PROT SERPL-MCNC: 8 G/DL — SIGNIFICANT CHANGE UP (ref 6–8.3)
PROT UR-MCNC: 30 MG/DL — HIGH
RBC # BLD: 4.19 M/UL — SIGNIFICANT CHANGE UP (ref 3.8–5.2)
RBC # FLD: 12.3 % — SIGNIFICANT CHANGE UP (ref 10.3–14.5)
RBC CASTS # UR COMP ASSIST: SIGNIFICANT CHANGE UP (ref 0–?)
SALICYLATES SERPL-MCNC: < 5 MG/DL — LOW (ref 15–30)
SODIUM SERPL-SCNC: 139 MMOL/L — SIGNIFICANT CHANGE UP (ref 135–145)
SP GR SPEC: 1.02 — SIGNIFICANT CHANGE UP (ref 1–1.04)
SQUAMOUS # UR AUTO: SIGNIFICANT CHANGE UP
TSH SERPL-MCNC: 0.81 UIU/ML — SIGNIFICANT CHANGE UP (ref 0.27–4.2)
UROBILINOGEN FLD QL: NORMAL MG/DL — SIGNIFICANT CHANGE UP
WBC # BLD: 5.45 K/UL — SIGNIFICANT CHANGE UP (ref 3.8–10.5)
WBC # FLD AUTO: 5.45 K/UL — SIGNIFICANT CHANGE UP (ref 3.8–10.5)
WBC UR QL: HIGH (ref 0–?)

## 2018-08-06 PROCEDURE — 99285 EMERGENCY DEPT VISIT HI MDM: CPT

## 2018-08-06 RX ORDER — DIPHENHYDRAMINE HCL 50 MG
25 CAPSULE ORAL ONCE
Qty: 0 | Refills: 0 | Status: COMPLETED | OUTPATIENT
Start: 2018-08-06 | End: 2018-08-06

## 2018-08-06 RX ORDER — HYDROXYZINE HCL 10 MG
25 TABLET ORAL EVERY 8 HOURS
Qty: 0 | Refills: 0 | Status: DISCONTINUED | OUTPATIENT
Start: 2018-08-06 | End: 2018-08-09

## 2018-08-06 RX ORDER — DIPHENHYDRAMINE HCL 50 MG
50 CAPSULE ORAL AT BEDTIME
Qty: 0 | Refills: 0 | Status: DISCONTINUED | OUTPATIENT
Start: 2018-08-06 | End: 2018-08-09

## 2018-08-06 RX ADMIN — Medication 25 MILLIGRAM(S): at 23:58

## 2018-08-06 RX ADMIN — Medication 2 MILLIGRAM(S): at 23:58

## 2018-08-06 NOTE — ED PROVIDER NOTE - MUSCULOSKELETAL, MLM
Spine appears normal, range of motion is not limited, no muscle or joint tenderness Minimal R foot tenderness, no deformity, no redness/swelling Normal neurovascular exam.

## 2018-08-06 NOTE — ED ADULT NURSE NOTE - OBJECTIVE STATEMENT
Pt received in  c/o SI, denies SI on presentation to , denies HI &AH.  pt denies ETOH and substance use. safety and comfort measures maintained. will continue to monitor.

## 2018-08-06 NOTE — ED BEHAVIORAL HEALTH ASSESSMENT NOTE - SUMMARY
21 y/o  female domiciled with mother and 3 y/o son, employed with [employer] services, with history of Mood Disorder NOS per CVM, 1 past psych hospitalization at [facility] in 2008 s/p suicide attempt via overdose on Tylenol and Motrin, ED visit in 2012 s/p overdose on 6 pills of Augmentin, recently in therapy, not on psychotropics at this time, brought in by EMS after boyfriend activated after patient sent a suicidal text that she was going to overdose and asked him to watch over her son. 21 y/o  female domiciled with mother and 1 y/o son, employed with [employer] services, with history of Mood Disorder NOS per CVM, 1 past psych hospitalization at [facility] in 2008 s/p suicide attempt via overdose on Tylenol and Motrin, ED visit in 2012 s/p overdose on 6 pills of Augmentin, recently in therapy, not on psychotropics at this time, brought in by EMS after boyfriend activated after patient sent a suicidal text in the context of an argument.     Patient denies SI/HI/I/P as well as jaziel and psychosis. Admits to sending a text out of frustration in the midst of an argument. Denies any intent to self harm. Admits to prior impulsive overdoses denies that she wants to do this at this time or is in that state of mind. Cites son as protective factor and is future oriented in wanting to put upcoming court case behind her and make plans to attend nursing school and move out of her home. Patient was offered voluntary admission and declined - she does not meet criteria for involuntary admission. She will be discharged home with a referral to [facility] crisis clinic. 21 y/o  female domiciled with mother and 3 y/o son, employed with [employer] services, with history of Mood Disorder NOS per CVM, 1 past psych hospitalization at [facility] in 2008 s/p suicide attempt via overdose on Tylenol and Motrin, ED visit in 2012 s/p overdose on 6 pills of Augmentin, recently in therapy, not on psychotropics at this time, brought in by EMS after boyfriend activated after patient sent a suicidal text in the context of an argument.     Patient denies SI/HI/I/P as well as jaziel and psychosis. She is guarded and minimizing the details of what brought her to the ED today. She reports a text was sent to one person, however, family reports it was three people that received her suicidal text. She has limited supports. She is going through a custody travis. She has an upcoming court case. She is not currently in treatment. she has a history of multiple prior overdoses (most recently known was 3/2018 and she did not follow up with consistent outpatient treatment). Patient was encouraged and offered voluntary admission and refused. At this time, patient is at high imminent risk of self harm and requires inpatient admission for safety and stabilization.

## 2018-08-06 NOTE — ED ADULT NURSE REASSESSMENT NOTE - NS ED NURSE REASSESS COMMENT FT1
Pt became very agitated when informed about admission plan. Pt yelling, stamping her feet, threatening to hurt somebody, stating "y'all better do something 'cos i'm about to bug the f**k out!!!" "i'm gonna hurt somebody and y'all can treat me however y'all wanna treat me!" "y'all better give me something to calm the f**k down!". Therapeutic communication employed, pt remained agitated, this time cursing at staff. PO meds suggested and pt declined. MD Glass notified. Pt medicated with IM meds for safety and stabilization (see EMR). Report given to KATYA ESTEBAN[address]. pt left  at 00:00 AM.

## 2018-08-06 NOTE — ED BEHAVIORAL HEALTH ASSESSMENT NOTE - DESCRIPTION
calm, cooperative, in good behavioral control     ICU Vital Signs Last 24 Hrs  T(C): 36.9 (06 Aug 2018 20:51), Max: 37 (06 Aug 2018 19:11)  T(F): 98.4 (06 Aug 2018 20:51), Max: 98.6 (06 Aug 2018 19:11)  HR: 89 (06 Aug 2018 20:51) (89 - 120)  BP: 112/74 (06 Aug 2018 20:51) (112/74 - 137/95)  BP(mean): --  ABP: --  ABP(mean): --  RR: 17 (06 Aug 2018 20:51) (17 - 18)  SpO2: 100% (06 Aug 2018 20:51) (100% - 100%) denies see HPI calm, cooperative, however irritable when admission was discussed.     ICU Vital Signs Last 24 Hrs  T(C): 36.9 (06 Aug 2018 20:51), Max: 37 (06 Aug 2018 19:11)  T(F): 98.4 (06 Aug 2018 20:51), Max: 98.6 (06 Aug 2018 19:11)  HR: 89 (06 Aug 2018 20:51) (89 - 120)  BP: 112/74 (06 Aug 2018 20:51) (112/74 - 137/95)  BP(mean): --  ABP: --  ABP(mean): --  RR: 17 (06 Aug 2018 20:51) (17 - 18)  SpO2: 100% (06 Aug 2018 20:51) (100% - 100%)

## 2018-08-06 NOTE — ED BEHAVIORAL HEALTH ASSESSMENT NOTE - PRIMARY DX
Adjustment disorder, unspecified type Deferred condition on axis II MDD (major depressive disorder), recurrent severe, without psychosis
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
## 2018-08-06 NOTE — ED BEHAVIORAL HEALTH ASSESSMENT NOTE - DIFFERENTIAL
unspecified adjustment disorder  unspecified mood disorder  unspecified anxiety disorder  borderline traits adjustment disorder  r/o depressive disorder
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
## 2018-08-06 NOTE — ED BEHAVIORAL HEALTH ASSESSMENT NOTE - PSYCHIATRIC ISSUES AND PLAN (INCLUDE STANDING AND PRN MEDICATION)
will defer medication choice to inpatient team; ativan 2mg IM prn agitation, vistaril prn anxiety, benadryl prn insomnia

## 2018-08-06 NOTE — ED PROVIDER NOTE - OBJECTIVE STATEMENT
22F h/o C section, prior orthopedic problems, on Motrin prn, prior depression and suicidal gestures. Texted a friend today saying she was considering suicide. She denies making any attempt. Denies taking any pills. Her friend called 911 who needed police as pt initially became agitated. On arrival in ED she is cooperative 22F h/o C section, prior orthopedic problems, on Motrin prn, prior depression and suicidal gestures. Texted a friend today saying she was considering suicide. She denies making any attempt. Denies taking any pills. Her friend called 911 who needed police as pt initially became agitated. On arrival in ED she is cooperative. She denies any acute medical symptoms, any drug use. She says her R foot and wrists hurt due to police restraint. Review of Symptoms in all systems otherwise negative, except as indicated

## 2018-08-06 NOTE — ED ADULT TRIAGE NOTE - CHIEF COMPLAINT QUOTE
pt BIBA from home, pt sent a text to kandy anitras "talking to" saying she wants to kill herself.  pt deneis SI and HI at this time.

## 2018-08-06 NOTE — ED BEHAVIORAL HEALTH ASSESSMENT NOTE - RISK ASSESSMENT
pt is not at acutely elevated risk of harm to self or others low to medium. patient has a chronically elevated risk due to prior suicidal gestures, poor frustration tolerance, one prior suicide attempt and prior inpatient admission, ongoing interpersonal issues with stepfather and upcoming court case/custody travis. Protective factors include no violence history, no access to guns, no global insomnia, no substance abuse, supportive family, willingness to seek help, no suicidal ideation or homicidal ideation, hopefulness for future. high risk for self harm at this time, as mentioned above in summary

## 2018-08-06 NOTE — ED PROVIDER NOTE - MEDICAL DECISION MAKING DETAILS
Depression with suicidal ideation. Medical evaluation performed. There is no clinical evidence of intoxication or any acute medical problem requiring immediate intervention. Patient is awaiting psychiatric consultation. Final disposition will be determined by psychiatrist.

## 2018-08-06 NOTE — ED BEHAVIORAL HEALTH ASSESSMENT NOTE - REFERRAL / APPOINTMENT DETAILS
follow-up with therapist, Tiffanie Eckert; pt provided with information for [facility] pt provided with information for [facility]
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
## 2018-08-06 NOTE — ED PROVIDER NOTE - CARE PLAN
Principal Discharge DX:	Depression  Secondary Diagnosis:	Suicidal ideation Principal Discharge DX:	Major depression  Secondary Diagnosis:	Suicidal ideation

## 2018-08-06 NOTE — ED ADULT NURSE NOTE - NSIMPLEMENTINTERV_GEN_ALL_ED
Implemented All Universal Safety Interventions:  [place] to call system. Call bell, personal items and telephone within reach. Instruct patient to call for assistance. Room bathroom lighting operational. Non-slip footwear when patient is off stretcher. Physically safe environment: no spills, clutter or unnecessary equipment. Stretcher in lowest position, wheels locked, appropriate side rails in place.
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
## 2018-08-07 PROCEDURE — 99223 1ST HOSP IP/OBS HIGH 75: CPT

## 2018-08-07 RX ORDER — IBUPROFEN 200 MG
400 TABLET ORAL EVERY 6 HOURS
Qty: 0 | Refills: 0 | Status: DISCONTINUED | OUTPATIENT
Start: 2018-08-07 | End: 2018-08-09

## 2018-08-07 RX ORDER — IBUPROFEN 200 MG
600 TABLET ORAL ONCE
Qty: 0 | Refills: 0 | Status: COMPLETED | OUTPATIENT
Start: 2018-08-07 | End: 2018-08-07

## 2018-08-07 RX ORDER — IBUPROFEN 200 MG
400 TABLET ORAL EVERY 6 HOURS
Qty: 0 | Refills: 0 | Status: DISCONTINUED | OUTPATIENT
Start: 2018-08-07 | End: 2018-08-07

## 2018-08-07 RX ORDER — IBUPROFEN 200 MG
400 TABLET ORAL ONCE
Qty: 0 | Refills: 0 | Status: COMPLETED | OUTPATIENT
Start: 2018-08-07 | End: 2018-08-07

## 2018-08-07 RX ADMIN — Medication 600 MILLIGRAM(S): at 02:05

## 2018-08-07 RX ADMIN — Medication 400 MILLIGRAM(S): at 22:39

## 2018-08-07 RX ADMIN — Medication 400 MILLIGRAM(S): at 23:10

## 2018-08-07 RX ADMIN — Medication 600 MILLIGRAM(S): at 01:23

## 2018-08-07 RX ADMIN — Medication 400 MILLIGRAM(S): at 15:45

## 2018-08-07 RX ADMIN — Medication 400 MILLIGRAM(S): at 14:33
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
## 2018-08-08 VITALS — RESPIRATION RATE: 18 BRPM | TEMPERATURE: 98 F

## 2018-08-08 PROCEDURE — 99232 SBSQ HOSP IP/OBS MODERATE 35: CPT

## 2018-08-08 PROCEDURE — 73630 X-RAY EXAM OF FOOT: CPT | Mod: 26,RT

## 2018-08-08 RX ADMIN — Medication 400 MILLIGRAM(S): at 16:14

## 2018-08-09 PROCEDURE — 99239 HOSP IP/OBS DSCHRG MGMT >30: CPT

## 2018-08-14 ENCOUNTER — OUTPATIENT (OUTPATIENT)
Dept: OUTPATIENT SERVICES | Facility: HOSPITAL | Age: 23
LOS: 1 days | Discharge: HOME | End: 2018-08-14

## 2018-08-14 DIAGNOSIS — M75.100 UNSPECIFIED ROTATOR CUFF TEAR OR RUPTURE OF UNSPECIFIED SHOULDER, NOT SPECIFIED AS TRAUMATIC: Chronic | ICD-10-CM

## 2018-08-14 DIAGNOSIS — Z98.89 OTHER SPECIFIED POSTPROCEDURAL STATES: Chronic | ICD-10-CM

## 2018-08-15 DIAGNOSIS — F43.29 ADJUSTMENT DISORDER WITH OTHER SYMPTOMS: ICD-10-CM

## 2019-01-13 ENCOUNTER — FORM ENCOUNTER (OUTPATIENT)
Age: 24
End: 2019-01-13

## 2019-01-14 ENCOUNTER — APPOINTMENT (OUTPATIENT)
Dept: RADIOLOGY | Facility: CLINIC | Age: 24
End: 2019-01-14
Payer: COMMERCIAL

## 2019-01-14 ENCOUNTER — OUTPATIENT (OUTPATIENT)
Dept: OUTPATIENT SERVICES | Facility: HOSPITAL | Age: 24
LOS: 1 days | End: 2019-01-14

## 2019-01-14 ENCOUNTER — APPOINTMENT (OUTPATIENT)
Dept: ORTHOPEDIC SURGERY | Facility: CLINIC | Age: 24
End: 2019-01-14
Payer: COMMERCIAL

## 2019-01-14 VITALS — HEIGHT: 65 IN | WEIGHT: 139 LBS | BODY MASS INDEX: 23.16 KG/M2

## 2019-01-14 DIAGNOSIS — Z78.9 OTHER SPECIFIED HEALTH STATUS: ICD-10-CM

## 2019-01-14 DIAGNOSIS — Z80.3 FAMILY HISTORY OF MALIGNANT NEOPLASM OF BREAST: ICD-10-CM

## 2019-01-14 DIAGNOSIS — Z56.0 UNEMPLOYMENT, UNSPECIFIED: ICD-10-CM

## 2019-01-14 DIAGNOSIS — M75.100 UNSPECIFIED ROTATOR CUFF TEAR OR RUPTURE OF UNSPECIFIED SHOULDER, NOT SPECIFIED AS TRAUMATIC: Chronic | ICD-10-CM

## 2019-01-14 DIAGNOSIS — Z98.89 OTHER SPECIFIED POSTPROCEDURAL STATES: Chronic | ICD-10-CM

## 2019-01-14 DIAGNOSIS — Z86.79 PERSONAL HISTORY OF OTHER DISEASES OF THE CIRCULATORY SYSTEM: ICD-10-CM

## 2019-01-14 DIAGNOSIS — Z82.61 FAMILY HISTORY OF ARTHRITIS: ICD-10-CM

## 2019-01-14 DIAGNOSIS — Z98.890 OTHER SPECIFIED POSTPROCEDURAL STATES: ICD-10-CM

## 2019-01-14 PROCEDURE — 73630 X-RAY EXAM OF FOOT: CPT | Mod: 26,50

## 2019-01-14 PROCEDURE — 73610 X-RAY EXAM OF ANKLE: CPT | Mod: 26,RT

## 2019-01-14 PROCEDURE — 99213 OFFICE O/P EST LOW 20 MIN: CPT

## 2019-01-14 RX ORDER — ACYCLOVIR 50 MG/G
5 OINTMENT TOPICAL
Qty: 30 | Refills: 0 | Status: DISCONTINUED | COMMUNITY
Start: 2017-07-24 | End: 2019-01-14

## 2019-01-14 RX ORDER — ERGOCALCIFEROL 1.25 MG/1
1.25 MG CAPSULE, LIQUID FILLED ORAL
Qty: 4 | Refills: 0 | Status: DISCONTINUED | COMMUNITY
Start: 2017-07-17 | End: 2019-01-14

## 2019-01-14 RX ORDER — MULTIVITAMIN
TABLET ORAL
Refills: 0 | Status: DISCONTINUED | COMMUNITY
End: 2019-01-14

## 2019-01-14 SDOH — ECONOMIC STABILITY - INCOME SECURITY: UNEMPLOYMENT, UNSPECIFIED: Z56.0

## 2019-01-24 ENCOUNTER — EMERGENCY (EMERGENCY)
Facility: HOSPITAL | Age: 24
LOS: 1 days | Discharge: HOME | End: 2019-01-24
Admitting: EMERGENCY MEDICINE
Payer: COMMERCIAL

## 2019-01-24 VITALS
DIASTOLIC BLOOD PRESSURE: 81 MMHG | TEMPERATURE: 98 F | HEART RATE: 79 BPM | SYSTOLIC BLOOD PRESSURE: 117 MMHG | OXYGEN SATURATION: 100 % | RESPIRATION RATE: 16 BRPM

## 2019-01-24 VITALS
RESPIRATION RATE: 16 BRPM | SYSTOLIC BLOOD PRESSURE: 113 MMHG | DIASTOLIC BLOOD PRESSURE: 72 MMHG | HEART RATE: 80 BPM | OXYGEN SATURATION: 98 % | TEMPERATURE: 98 F

## 2019-01-24 DIAGNOSIS — M75.100 UNSPECIFIED ROTATOR CUFF TEAR OR RUPTURE OF UNSPECIFIED SHOULDER, NOT SPECIFIED AS TRAUMATIC: Chronic | ICD-10-CM

## 2019-01-24 DIAGNOSIS — Z98.89 OTHER SPECIFIED POSTPROCEDURAL STATES: Chronic | ICD-10-CM

## 2019-01-24 LAB
ALBUMIN SERPL ELPH-MCNC: 4.1 G/DL — SIGNIFICANT CHANGE UP (ref 3.3–5)
ALP SERPL-CCNC: 49 U/L — SIGNIFICANT CHANGE UP (ref 40–120)
ALT FLD-CCNC: 8 U/L — SIGNIFICANT CHANGE UP (ref 4–33)
ANION GAP SERPL CALC-SCNC: 11 MMO/L — SIGNIFICANT CHANGE UP (ref 7–14)
APPEARANCE UR: CLEAR — SIGNIFICANT CHANGE UP
AST SERPL-CCNC: 15 U/L — SIGNIFICANT CHANGE UP (ref 4–32)
BACTERIA # UR AUTO: NEGATIVE — SIGNIFICANT CHANGE UP
BASOPHILS # BLD AUTO: 0.01 K/UL — SIGNIFICANT CHANGE UP (ref 0–0.2)
BASOPHILS NFR BLD AUTO: 0.3 % — SIGNIFICANT CHANGE UP (ref 0–2)
BILIRUB SERPL-MCNC: 0.3 MG/DL — SIGNIFICANT CHANGE UP (ref 0.2–1.2)
BILIRUB UR-MCNC: NEGATIVE — SIGNIFICANT CHANGE UP
BLOOD UR QL VISUAL: NEGATIVE — SIGNIFICANT CHANGE UP
BUN SERPL-MCNC: 12 MG/DL — SIGNIFICANT CHANGE UP (ref 7–23)
CALCIUM SERPL-MCNC: 9.2 MG/DL — SIGNIFICANT CHANGE UP (ref 8.4–10.5)
CHLORIDE SERPL-SCNC: 106 MMOL/L — SIGNIFICANT CHANGE UP (ref 98–107)
CO2 SERPL-SCNC: 24 MMOL/L — SIGNIFICANT CHANGE UP (ref 22–31)
COLOR SPEC: YELLOW — SIGNIFICANT CHANGE UP
CREAT SERPL-MCNC: 0.64 MG/DL — SIGNIFICANT CHANGE UP (ref 0.5–1.3)
EOSINOPHIL # BLD AUTO: 0.07 K/UL — SIGNIFICANT CHANGE UP (ref 0–0.5)
EOSINOPHIL NFR BLD AUTO: 1.9 % — SIGNIFICANT CHANGE UP (ref 0–6)
GLUCOSE SERPL-MCNC: 92 MG/DL — SIGNIFICANT CHANGE UP (ref 70–99)
GLUCOSE UR-MCNC: NEGATIVE — SIGNIFICANT CHANGE UP
HCT VFR BLD CALC: 35.9 % — SIGNIFICANT CHANGE UP (ref 34.5–45)
HGB BLD-MCNC: 11.8 G/DL — SIGNIFICANT CHANGE UP (ref 11.5–15.5)
HYALINE CASTS # UR AUTO: NEGATIVE — SIGNIFICANT CHANGE UP
IMM GRANULOCYTES NFR BLD AUTO: 0.3 % — SIGNIFICANT CHANGE UP (ref 0–1.5)
KETONES UR-MCNC: NEGATIVE — SIGNIFICANT CHANGE UP
LEUKOCYTE ESTERASE UR-ACNC: NEGATIVE — SIGNIFICANT CHANGE UP
LIDOCAIN IGE QN: 35.2 U/L — SIGNIFICANT CHANGE UP (ref 7–60)
LYMPHOCYTES # BLD AUTO: 1.43 K/UL — SIGNIFICANT CHANGE UP (ref 1–3.3)
LYMPHOCYTES # BLD AUTO: 38.5 % — SIGNIFICANT CHANGE UP (ref 13–44)
MCHC RBC-ENTMCNC: 31.1 PG — SIGNIFICANT CHANGE UP (ref 27–34)
MCHC RBC-ENTMCNC: 32.9 % — SIGNIFICANT CHANGE UP (ref 32–36)
MCV RBC AUTO: 94.5 FL — SIGNIFICANT CHANGE UP (ref 80–100)
MONOCYTES # BLD AUTO: 0.34 K/UL — SIGNIFICANT CHANGE UP (ref 0–0.9)
MONOCYTES NFR BLD AUTO: 9.2 % — SIGNIFICANT CHANGE UP (ref 2–14)
NEUTROPHILS # BLD AUTO: 1.85 K/UL — SIGNIFICANT CHANGE UP (ref 1.8–7.4)
NEUTROPHILS NFR BLD AUTO: 49.8 % — SIGNIFICANT CHANGE UP (ref 43–77)
NITRITE UR-MCNC: NEGATIVE — SIGNIFICANT CHANGE UP
NRBC # FLD: 0 K/UL — LOW (ref 25–125)
PH UR: 7.5 — SIGNIFICANT CHANGE UP (ref 5–8)
PLATELET # BLD AUTO: 204 K/UL — SIGNIFICANT CHANGE UP (ref 150–400)
PMV BLD: 11.4 FL — SIGNIFICANT CHANGE UP (ref 7–13)
POTASSIUM SERPL-MCNC: 4.1 MMOL/L — SIGNIFICANT CHANGE UP (ref 3.5–5.3)
POTASSIUM SERPL-SCNC: 4.1 MMOL/L — SIGNIFICANT CHANGE UP (ref 3.5–5.3)
PROT SERPL-MCNC: 6.9 G/DL — SIGNIFICANT CHANGE UP (ref 6–8.3)
PROT UR-MCNC: 20 — SIGNIFICANT CHANGE UP
RBC # BLD: 3.8 M/UL — SIGNIFICANT CHANGE UP (ref 3.8–5.2)
RBC # FLD: 12.9 % — SIGNIFICANT CHANGE UP (ref 10.3–14.5)
RBC CASTS # UR COMP ASSIST: SIGNIFICANT CHANGE UP (ref 0–?)
SODIUM SERPL-SCNC: 141 MMOL/L — SIGNIFICANT CHANGE UP (ref 135–145)
SP GR SPEC: 1.03 — SIGNIFICANT CHANGE UP (ref 1–1.04)
SQUAMOUS # UR AUTO: SIGNIFICANT CHANGE UP
UROBILINOGEN FLD QL: NORMAL — SIGNIFICANT CHANGE UP
WBC # BLD: 3.71 K/UL — LOW (ref 3.8–10.5)
WBC # FLD AUTO: 3.71 K/UL — LOW (ref 3.8–10.5)
WBC UR QL: SIGNIFICANT CHANGE UP (ref 0–?)

## 2019-01-24 PROCEDURE — 74177 CT ABD & PELVIS W/CONTRAST: CPT | Mod: 26

## 2019-01-24 PROCEDURE — 99285 EMERGENCY DEPT VISIT HI MDM: CPT

## 2019-01-24 RX ORDER — DICLOFENAC SODIUM 75 MG/1
1 TABLET, DELAYED RELEASE ORAL
Qty: 10 | Refills: 0
Start: 2019-01-24 | End: 2019-01-28

## 2019-01-24 RX ORDER — SODIUM CHLORIDE 9 MG/ML
1000 INJECTION INTRAMUSCULAR; INTRAVENOUS; SUBCUTANEOUS ONCE
Qty: 0 | Refills: 0 | Status: COMPLETED | OUTPATIENT
Start: 2019-01-24 | End: 2019-01-24

## 2019-01-24 RX ORDER — KETOROLAC TROMETHAMINE 30 MG/ML
30 SYRINGE (ML) INJECTION ONCE
Qty: 0 | Refills: 0 | Status: DISCONTINUED | OUTPATIENT
Start: 2019-01-24 | End: 2019-01-24

## 2019-01-24 RX ORDER — MORPHINE SULFATE 50 MG/1
2 CAPSULE, EXTENDED RELEASE ORAL ONCE
Qty: 0 | Refills: 0 | Status: DISCONTINUED | OUTPATIENT
Start: 2019-01-24 | End: 2019-01-24

## 2019-01-24 RX ADMIN — MORPHINE SULFATE 2 MILLIGRAM(S): 50 CAPSULE, EXTENDED RELEASE ORAL at 13:55

## 2019-01-24 RX ADMIN — SODIUM CHLORIDE 1000 MILLILITER(S): 9 INJECTION INTRAMUSCULAR; INTRAVENOUS; SUBCUTANEOUS at 14:39

## 2019-01-24 RX ADMIN — SODIUM CHLORIDE 2000 MILLILITER(S): 9 INJECTION INTRAMUSCULAR; INTRAVENOUS; SUBCUTANEOUS at 13:39

## 2019-01-24 RX ADMIN — MORPHINE SULFATE 2 MILLIGRAM(S): 50 CAPSULE, EXTENDED RELEASE ORAL at 13:39

## 2019-01-24 RX ADMIN — Medication 30 MILLIGRAM(S): at 15:20

## 2019-01-24 NOTE — ED ADULT NURSE NOTE - OBJECTIVE STATEMENT
PT received into RADS A&Ox4, ambulatory C/O Epigastric ABD pain radiating Left flank. Denies N/V/D, hematuria, fevers, chills. MD evaluated, 20 G IV placed to R AC, labs sent, medicated as ordered. Family at bedside, will continue to monitor for safety and comfort.

## 2019-01-24 NOTE — ED PROVIDER NOTE - OBJECTIVE STATEMENT
22yo female w/ hx of ovarian cysts presented to ED c/o R sided abdominal pain x a couple of hours. Pain began at 11AM while sitting down at work. Described pain as sharp, 20/10, coming in waves, and localized to mid abdominal region. pt. states ate a chicken/avocado sandwich roughly 1 hour before pain began. Pt did not take anything for the pain, nothing makes it better, but movement and touch makes the pain worse. Patient states pain is now radiating to R flank and pelvis, and has come down to a 6/10. Denies prior episodes of this occurring, but admits pain is similar to when her ovarian cysts ruptured. LMP ended 1/23/19. Denies sexual activity for several months.  No N/V, fevers, chills, HA, constipation, diarrhea, changes in urinary habits.

## 2019-01-24 NOTE — ED PROVIDER NOTE - MEDICAL DECISION MAKING DETAILS
24 yo female c/o abdominal pain x a couple of hours.  - possibly acute appendicitis. r/o intraabdominal pathology  -CBC, CMP, lipase, UA, UCG, IV fluids, pain control   - CT a/p

## 2019-01-24 NOTE — ED PROVIDER NOTE - PROGRESS NOTE DETAILS
Dr. Menon: I am administratively signing this document as per hospital policy.  I was available for consultation for this patient.  I did not evaluate the patient, did not have a doctor/patient relationship with the patient, and did not participate in any medical decision making or disposition decisions unless I am specifically named in the chart as having consulted on the patient.

## 2019-01-25 ENCOUNTER — EMERGENCY (EMERGENCY)
Facility: HOSPITAL | Age: 24
LOS: 1 days | Discharge: HOME | End: 2019-01-25
Attending: EMERGENCY MEDICINE | Admitting: EMERGENCY MEDICINE
Payer: COMMERCIAL

## 2019-01-25 VITALS
SYSTOLIC BLOOD PRESSURE: 126 MMHG | RESPIRATION RATE: 16 BRPM | TEMPERATURE: 98 F | OXYGEN SATURATION: 100 % | HEART RATE: 76 BPM | DIASTOLIC BLOOD PRESSURE: 89 MMHG

## 2019-01-25 DIAGNOSIS — Z98.89 OTHER SPECIFIED POSTPROCEDURAL STATES: Chronic | ICD-10-CM

## 2019-01-25 DIAGNOSIS — M75.100 UNSPECIFIED ROTATOR CUFF TEAR OR RUPTURE OF UNSPECIFIED SHOULDER, NOT SPECIFIED AS TRAUMATIC: Chronic | ICD-10-CM

## 2019-01-25 LAB
ALBUMIN SERPL ELPH-MCNC: 4.5 G/DL — SIGNIFICANT CHANGE UP (ref 3.3–5)
ALP SERPL-CCNC: 46 U/L — SIGNIFICANT CHANGE UP (ref 40–120)
ALT FLD-CCNC: 9 U/L — SIGNIFICANT CHANGE UP (ref 4–33)
ANION GAP SERPL CALC-SCNC: 12 MMO/L — SIGNIFICANT CHANGE UP (ref 7–14)
APTT BLD: 33.1 SEC — SIGNIFICANT CHANGE UP (ref 27.5–36.3)
AST SERPL-CCNC: 14 U/L — SIGNIFICANT CHANGE UP (ref 4–32)
BASE EXCESS BLDV CALC-SCNC: 0.6 MMOL/L — SIGNIFICANT CHANGE UP
BASOPHILS # BLD AUTO: 0.01 K/UL — SIGNIFICANT CHANGE UP (ref 0–0.2)
BASOPHILS NFR BLD AUTO: 0.2 % — SIGNIFICANT CHANGE UP (ref 0–2)
BILIRUB SERPL-MCNC: 0.4 MG/DL — SIGNIFICANT CHANGE UP (ref 0.2–1.2)
BLOOD GAS VENOUS - CREATININE: 0.51 MG/DL — SIGNIFICANT CHANGE UP (ref 0.5–1.3)
BUN SERPL-MCNC: 12 MG/DL — SIGNIFICANT CHANGE UP (ref 7–23)
CALCIUM SERPL-MCNC: 9.3 MG/DL — SIGNIFICANT CHANGE UP (ref 8.4–10.5)
CHLORIDE BLDV-SCNC: 115 MMOL/L — HIGH (ref 96–108)
CHLORIDE SERPL-SCNC: 106 MMOL/L — SIGNIFICANT CHANGE UP (ref 98–107)
CO2 SERPL-SCNC: 24 MMOL/L — SIGNIFICANT CHANGE UP (ref 22–31)
CREAT SERPL-MCNC: 0.69 MG/DL — SIGNIFICANT CHANGE UP (ref 0.5–1.3)
EOSINOPHIL # BLD AUTO: 0.09 K/UL — SIGNIFICANT CHANGE UP (ref 0–0.5)
EOSINOPHIL NFR BLD AUTO: 2.2 % — SIGNIFICANT CHANGE UP (ref 0–6)
GAS PNL BLDV: 139 MMOL/L — SIGNIFICANT CHANGE UP (ref 136–146)
GLUCOSE BLDV-MCNC: 85 — SIGNIFICANT CHANGE UP (ref 70–99)
GLUCOSE SERPL-MCNC: 84 MG/DL — SIGNIFICANT CHANGE UP (ref 70–99)
HCG SERPL-ACNC: < 5 MIU/ML — SIGNIFICANT CHANGE UP
HCO3 BLDV-SCNC: 24 MMOL/L — SIGNIFICANT CHANGE UP (ref 20–27)
HCT VFR BLD CALC: 36.4 % — SIGNIFICANT CHANGE UP (ref 34.5–45)
HCT VFR BLDV CALC: 33 % — LOW (ref 34.5–45)
HGB BLD-MCNC: 12 G/DL — SIGNIFICANT CHANGE UP (ref 11.5–15.5)
HGB BLDV-MCNC: 10.7 G/DL — LOW (ref 11.5–15.5)
IMM GRANULOCYTES NFR BLD AUTO: 0.2 % — SIGNIFICANT CHANGE UP (ref 0–1.5)
INR BLD: 1.03 — SIGNIFICANT CHANGE UP (ref 0.88–1.17)
LACTATE BLDV-MCNC: 0.9 MMOL/L — SIGNIFICANT CHANGE UP (ref 0.5–2)
LYMPHOCYTES # BLD AUTO: 1.9 K/UL — SIGNIFICANT CHANGE UP (ref 1–3.3)
LYMPHOCYTES # BLD AUTO: 47.1 % — HIGH (ref 13–44)
MCHC RBC-ENTMCNC: 31 PG — SIGNIFICANT CHANGE UP (ref 27–34)
MCHC RBC-ENTMCNC: 33 % — SIGNIFICANT CHANGE UP (ref 32–36)
MCV RBC AUTO: 94.1 FL — SIGNIFICANT CHANGE UP (ref 80–100)
MONOCYTES # BLD AUTO: 0.23 K/UL — SIGNIFICANT CHANGE UP (ref 0–0.9)
MONOCYTES NFR BLD AUTO: 5.7 % — SIGNIFICANT CHANGE UP (ref 2–14)
NEUTROPHILS # BLD AUTO: 1.79 K/UL — LOW (ref 1.8–7.4)
NEUTROPHILS NFR BLD AUTO: 44.6 % — SIGNIFICANT CHANGE UP (ref 43–77)
NRBC # FLD: 0 K/UL — LOW (ref 25–125)
PCO2 BLDV: 48 MMHG — SIGNIFICANT CHANGE UP (ref 41–51)
PH BLDV: 7.34 PH — SIGNIFICANT CHANGE UP (ref 7.32–7.43)
PLATELET # BLD AUTO: 231 K/UL — SIGNIFICANT CHANGE UP (ref 150–400)
PMV BLD: 11.6 FL — SIGNIFICANT CHANGE UP (ref 7–13)
PO2 BLDV: 35 MMHG — SIGNIFICANT CHANGE UP (ref 35–40)
POTASSIUM BLDV-SCNC: 3.1 MMOL/L — LOW (ref 3.4–4.5)
POTASSIUM SERPL-MCNC: 3.6 MMOL/L — SIGNIFICANT CHANGE UP (ref 3.5–5.3)
POTASSIUM SERPL-SCNC: 3.6 MMOL/L — SIGNIFICANT CHANGE UP (ref 3.5–5.3)
PROT SERPL-MCNC: 7.5 G/DL — SIGNIFICANT CHANGE UP (ref 6–8.3)
PROTHROM AB SERPL-ACNC: 11.7 SEC — SIGNIFICANT CHANGE UP (ref 9.8–13.1)
RBC # BLD: 3.87 M/UL — SIGNIFICANT CHANGE UP (ref 3.8–5.2)
RBC # FLD: 12.5 % — SIGNIFICANT CHANGE UP (ref 10.3–14.5)
SAO2 % BLDV: 59.4 % — LOW (ref 60–85)
SODIUM SERPL-SCNC: 142 MMOL/L — SIGNIFICANT CHANGE UP (ref 135–145)
WBC # BLD: 4.03 K/UL — SIGNIFICANT CHANGE UP (ref 3.8–10.5)
WBC # FLD AUTO: 4.03 K/UL — SIGNIFICANT CHANGE UP (ref 3.8–10.5)

## 2019-01-25 PROCEDURE — 76830 TRANSVAGINAL US NON-OB: CPT | Mod: 26

## 2019-01-25 PROCEDURE — 93975 VASCULAR STUDY: CPT | Mod: 26

## 2019-01-25 PROCEDURE — 99285 EMERGENCY DEPT VISIT HI MDM: CPT | Mod: 25

## 2019-01-25 RX ORDER — KETOROLAC TROMETHAMINE 30 MG/ML
30 SYRINGE (ML) INJECTION ONCE
Qty: 0 | Refills: 0 | Status: DISCONTINUED | OUTPATIENT
Start: 2019-01-25 | End: 2019-01-25

## 2019-01-25 RX ORDER — MORPHINE SULFATE 50 MG/1
4 CAPSULE, EXTENDED RELEASE ORAL ONCE
Qty: 0 | Refills: 0 | Status: DISCONTINUED | OUTPATIENT
Start: 2019-01-25 | End: 2019-01-25

## 2019-01-25 RX ORDER — SODIUM CHLORIDE 9 MG/ML
1000 INJECTION INTRAMUSCULAR; INTRAVENOUS; SUBCUTANEOUS ONCE
Qty: 0 | Refills: 0 | Status: COMPLETED | OUTPATIENT
Start: 2019-01-25 | End: 2019-01-25

## 2019-01-25 RX ORDER — ONDANSETRON 8 MG/1
4 TABLET, FILM COATED ORAL ONCE
Qty: 0 | Refills: 0 | Status: COMPLETED | OUTPATIENT
Start: 2019-01-25 | End: 2019-01-25

## 2019-01-25 RX ADMIN — SODIUM CHLORIDE 1000 MILLILITER(S): 9 INJECTION INTRAMUSCULAR; INTRAVENOUS; SUBCUTANEOUS at 21:03

## 2019-01-25 RX ADMIN — MORPHINE SULFATE 4 MILLIGRAM(S): 50 CAPSULE, EXTENDED RELEASE ORAL at 20:53

## 2019-01-25 RX ADMIN — Medication 30 MILLIGRAM(S): at 23:09

## 2019-01-25 NOTE — ED PROVIDER NOTE - PROGRESS NOTE DETAILS
pt going to US, pain "worse" with nausea. will medicate. OBGYN consulted. AUSTYN FRENCH: Spoke with GYN, states to call after patient return from US. pt in US still. pending read, obgyn consult. Will endorse to DR Terry GYN w pt now. Surgery consulted. Dr Terry aware. pt care transferred. AUSTYN ervin: pt seen by GYN, surg also paged to eval patient given persistent RLQ pain. AUSTYN Graham: GYN saw patient, d/w attending, reports possible that she could have ruptured a cyst however benign internal exam and normal TVUS. Reports to d/c home with NSAIDs for presumed ovarian cyst, and agrees with plan for surgical eval given persistent pain. Pt offered CDU for monitoring, pain meds, and repeat labs. AUSTYN Graham: seen by surgery, does not believe that this is appendicitis. will dc to CDU for serial abdominal exams, repeat labs, meds PRN. pt agrees with plan.

## 2019-01-25 NOTE — ED PROVIDER NOTE - MEDICAL DECISION MAKING DETAILS
plan reviewed prior chart. labs, urine, TVUS, IVF, pain med, and re assess 24 yo F, nonsmoker with PMH of ovarian cyst presents to c/o worsening RLQ pain today a/w nausea. Female appears in pain and discomfort, was seen in ER yesterday with epigastric/mid abd pain, had neg CT scan, today pain is excruciating and radiating to RLQ, seen at OB today, had TVUS w/ no acute finding, and sent to ER for r/o appendicitis, pain is intermittent a/w nausea, no vaginal bleeding, afebrile, concern  right ovarian torsion vs appendicitis. plan reviewed prior chart. labs, urine, TVUS, IVF, pain med, GYN consult and re assess

## 2019-01-25 NOTE — ED ADULT TRIAGE NOTE - CHIEF COMPLAINT QUOTE
c/o mid abdominal pain with radiation to right flank.  (+) nausea, No vomiting or diarrhea, no fever. Was seen at [facility] yesterday for same symptoms and dx corpus luteum cyst and referred to OB.  Patient saw OB today and sent to ED for r/o appendicitis.  PMH: asthma

## 2019-01-25 NOTE — ED PROVIDER NOTE - CONSTITUTIONAL, MLM
normal... Well appearing, well nourished, awake, alert, oriented to person, place, time/situation and in discomfort and pain.

## 2019-01-25 NOTE — ED ADULT NURSE NOTE - OBJECTIVE STATEMENT
Patient is awake, A&O x 4, NAD, was seen in ED yesterday and discharged home, returned to today for worsening of abdominal pain, no relief with OTC Tylenol.  She was seen by her OB/GYN to r/o cyst and advised her pain was not related to her cysts.  Complaining of RLQ pain radiates to pelvic area.  No nausea, vomiting, fever or vaginal discharge/bleeding.

## 2019-01-25 NOTE — ED ADULT NURSE NOTE - CHIEF COMPLAINT QUOTE
c/o mid abdominal pain with radiation to right flank.  (+) nausea, No vomiting or diarrhea, no fever. Was seen at [facility] yesterday for same symptoms and dx corpus luteum cyst and referred to OB.  Patient saw OB today and sent to ED for r/o appendicitis.  PMH: asthma

## 2019-01-25 NOTE — ED PROVIDER NOTE - OBJECTIVE STATEMENT
22 yo F, nonsmoker with PMH of ovarian cyst presents to c/o worsening RLQ pain today a/w nausea. Pt states she was seen in the ED yesterday for pain in mid abdomen, now pain is radiating to RLQ. Reports pain is sharp, sudden onset, intermittent, 10/10, worse with movements. Reports seen by different OBGYN today at the office, had transvaginal US, states it was normal and advised to go to ER to r/o appendicitis. Denies any fever, vomiting, diarrhea, constipation, chest pain, sob, back pain, dysuria, hematuria, vaginal bleeding, recent travel, or any other complaints.

## 2019-01-25 NOTE — ED PROVIDER NOTE - ATTENDING CONTRIBUTION TO CARE
Attending Statement: I have reviewed and agree with all pertinent clinical information, including history and physical exam and agree with treatment plan of the PA, except as noted.  22yo F no sig pmhx pw abdominal pain since yesterday. States pain started epigastric it "Moved to the right lower". Pain now "right side, described as "sharp pain comes in waves" wane/wax intensity, "very uncomfortable" associated with nausea. no vomit. no diarrhea. no dysuria no hematuria no urgency no vag bleeding, LMP 'Just finished" States she is not sexually active, no hx of STD. . Was seen in ED a day ago for same pain, neg ct, referred to OB. Today went to see "a new GYN" had a US "ok" told to go back to ED. Last ate at 2pm.   Vital signs noted. mmm. nontoxic, normal S1-S2 No resp distress. able to speak in full and clear sentences. no wheeze, rales or stridor. soft tender right periumbilical and RLQ, neg Weller, no cvat. pelvic done by AUSTYN DARDEN   plan reviewed prior chart. labs, urine, TVUS, IVF, pain med, and re assess

## 2019-01-25 NOTE — ED ADULT TRIAGE NOTE - TEMPERATURE IN CELSIUS (DEGREES C)
Alert-The patient is alert, awake and responds to voice. The patient is oriented to time, place, and person. The triage nurse is able to obtain subjective information. 36.8

## 2019-01-26 VITALS
DIASTOLIC BLOOD PRESSURE: 67 MMHG | HEART RATE: 77 BPM | SYSTOLIC BLOOD PRESSURE: 106 MMHG | OXYGEN SATURATION: 100 % | RESPIRATION RATE: 14 BRPM | TEMPERATURE: 98 F

## 2019-01-26 LAB
ALBUMIN SERPL ELPH-MCNC: 3.6 G/DL — SIGNIFICANT CHANGE UP (ref 3.3–5)
ALP SERPL-CCNC: 34 U/L — LOW (ref 40–120)
ALT FLD-CCNC: 8 U/L — SIGNIFICANT CHANGE UP (ref 4–33)
ANION GAP SERPL CALC-SCNC: 9 MMO/L — SIGNIFICANT CHANGE UP (ref 7–14)
ANISOCYTOSIS BLD QL: SLIGHT — SIGNIFICANT CHANGE UP
APPEARANCE UR: CLEAR — SIGNIFICANT CHANGE UP
AST SERPL-CCNC: 13 U/L — SIGNIFICANT CHANGE UP (ref 4–32)
BASOPHILS # BLD AUTO: 0.01 K/UL — SIGNIFICANT CHANGE UP (ref 0–0.2)
BASOPHILS NFR BLD AUTO: 0.4 % — SIGNIFICANT CHANGE UP (ref 0–2)
BASOPHILS NFR SPEC: 0 % — SIGNIFICANT CHANGE UP (ref 0–2)
BILIRUB SERPL-MCNC: 0.6 MG/DL — SIGNIFICANT CHANGE UP (ref 0.2–1.2)
BILIRUB UR-MCNC: NEGATIVE — SIGNIFICANT CHANGE UP
BLASTS # FLD: 0 % — SIGNIFICANT CHANGE UP (ref 0–0)
BLOOD UR QL VISUAL: NEGATIVE — SIGNIFICANT CHANGE UP
BUN SERPL-MCNC: 10 MG/DL — SIGNIFICANT CHANGE UP (ref 7–23)
CALCIUM SERPL-MCNC: 8.4 MG/DL — SIGNIFICANT CHANGE UP (ref 8.4–10.5)
CHLORIDE SERPL-SCNC: 108 MMOL/L — HIGH (ref 98–107)
CO2 SERPL-SCNC: 24 MMOL/L — SIGNIFICANT CHANGE UP (ref 22–31)
COLOR SPEC: SIGNIFICANT CHANGE UP
CREAT SERPL-MCNC: 0.63 MG/DL — SIGNIFICANT CHANGE UP (ref 0.5–1.3)
EOSINOPHIL # BLD AUTO: 0.06 K/UL — SIGNIFICANT CHANGE UP (ref 0–0.5)
EOSINOPHIL NFR BLD AUTO: 2.4 % — SIGNIFICANT CHANGE UP (ref 0–6)
EOSINOPHIL NFR FLD: 0 % — SIGNIFICANT CHANGE UP (ref 0–6)
GIANT PLATELETS BLD QL SMEAR: PRESENT — SIGNIFICANT CHANGE UP
GLUCOSE SERPL-MCNC: 90 MG/DL — SIGNIFICANT CHANGE UP (ref 70–99)
GLUCOSE UR-MCNC: NEGATIVE — SIGNIFICANT CHANGE UP
HCT VFR BLD CALC: 34.9 % — SIGNIFICANT CHANGE UP (ref 34.5–45)
HGB BLD-MCNC: 11 G/DL — LOW (ref 11.5–15.5)
IMM GRANULOCYTES NFR BLD AUTO: 0 % — SIGNIFICANT CHANGE UP (ref 0–1.5)
KETONES UR-MCNC: HIGH
LEUKOCYTE ESTERASE UR-ACNC: NEGATIVE — SIGNIFICANT CHANGE UP
LYMPHOCYTES # BLD AUTO: 1.39 K/UL — SIGNIFICANT CHANGE UP (ref 1–3.3)
LYMPHOCYTES # BLD AUTO: 54.7 % — HIGH (ref 13–44)
LYMPHOCYTES NFR SPEC AUTO: 52.7 % — HIGH (ref 13–44)
MACROCYTES BLD QL: SLIGHT — SIGNIFICANT CHANGE UP
MCHC RBC-ENTMCNC: 30.1 PG — SIGNIFICANT CHANGE UP (ref 27–34)
MCHC RBC-ENTMCNC: 31.5 % — LOW (ref 32–36)
MCV RBC AUTO: 95.4 FL — SIGNIFICANT CHANGE UP (ref 80–100)
METAMYELOCYTES # FLD: 0 % — SIGNIFICANT CHANGE UP (ref 0–1)
MONOCYTES # BLD AUTO: 0.17 K/UL — SIGNIFICANT CHANGE UP (ref 0–0.9)
MONOCYTES NFR BLD AUTO: 6.7 % — SIGNIFICANT CHANGE UP (ref 2–14)
MONOCYTES NFR BLD: 8.9 % — SIGNIFICANT CHANGE UP (ref 2–9)
MYELOCYTES NFR BLD: 0 % — SIGNIFICANT CHANGE UP (ref 0–0)
NEUTROPHIL AB SER-ACNC: 33 % — LOW (ref 43–77)
NEUTROPHILS # BLD AUTO: 0.91 K/UL — LOW (ref 1.8–7.4)
NEUTROPHILS NFR BLD AUTO: 35.8 % — LOW (ref 43–77)
NEUTS BAND # BLD: 0 % — SIGNIFICANT CHANGE UP (ref 0–6)
NITRITE UR-MCNC: NEGATIVE — SIGNIFICANT CHANGE UP
NRBC # FLD: 0 K/UL — LOW (ref 25–125)
OTHER - HEMATOLOGY %: 0 — SIGNIFICANT CHANGE UP
PH UR: 6.5 — SIGNIFICANT CHANGE UP (ref 5–8)
PLATELET # BLD AUTO: 203 K/UL — SIGNIFICANT CHANGE UP (ref 150–400)
PLATELET COUNT - ESTIMATE: NORMAL — SIGNIFICANT CHANGE UP
PMV BLD: 11.7 FL — SIGNIFICANT CHANGE UP (ref 7–13)
POTASSIUM SERPL-MCNC: 3.5 MMOL/L — SIGNIFICANT CHANGE UP (ref 3.5–5.3)
POTASSIUM SERPL-SCNC: 3.5 MMOL/L — SIGNIFICANT CHANGE UP (ref 3.5–5.3)
PROMYELOCYTES # FLD: 0 % — SIGNIFICANT CHANGE UP (ref 0–0)
PROT SERPL-MCNC: 6 G/DL — SIGNIFICANT CHANGE UP (ref 6–8.3)
PROT UR-MCNC: NEGATIVE — SIGNIFICANT CHANGE UP
RBC # BLD: 3.66 M/UL — LOW (ref 3.8–5.2)
RBC # FLD: 12.2 % — SIGNIFICANT CHANGE UP (ref 10.3–14.5)
SMUDGE CELLS # BLD: PRESENT — SIGNIFICANT CHANGE UP
SODIUM SERPL-SCNC: 141 MMOL/L — SIGNIFICANT CHANGE UP (ref 135–145)
SP GR SPEC: 1.02 — SIGNIFICANT CHANGE UP (ref 1–1.04)
UROBILINOGEN FLD QL: NORMAL — SIGNIFICANT CHANGE UP
VARIANT LYMPHS # BLD: 4.5 % — SIGNIFICANT CHANGE UP
WBC # BLD: 2.54 K/UL — LOW (ref 3.8–10.5)
WBC # FLD AUTO: 2.54 K/UL — LOW (ref 3.8–10.5)

## 2019-01-26 PROCEDURE — 99236 HOSP IP/OBS SAME DATE HI 85: CPT

## 2019-01-26 PROCEDURE — 76705 ECHO EXAM OF ABDOMEN: CPT | Mod: 26,76

## 2019-01-26 RX ORDER — SUCRALFATE 1 G
1 TABLET ORAL ONCE
Qty: 0 | Refills: 0 | Status: COMPLETED | OUTPATIENT
Start: 2019-01-26 | End: 2019-01-26

## 2019-01-26 RX ORDER — IBUPROFEN 200 MG
400 TABLET ORAL ONCE
Qty: 0 | Refills: 0 | Status: COMPLETED | OUTPATIENT
Start: 2019-01-26 | End: 2019-01-26

## 2019-01-26 RX ORDER — FAMOTIDINE 10 MG/ML
20 INJECTION INTRAVENOUS ONCE
Qty: 0 | Refills: 0 | Status: COMPLETED | OUTPATIENT
Start: 2019-01-26 | End: 2019-01-26

## 2019-01-26 RX ORDER — FAMOTIDINE 10 MG/ML
20 INJECTION INTRAVENOUS ONCE
Qty: 0 | Refills: 0 | Status: DISCONTINUED | OUTPATIENT
Start: 2019-01-26 | End: 2019-01-26

## 2019-01-26 RX ORDER — ACETAMINOPHEN 500 MG
1000 TABLET ORAL ONCE
Qty: 0 | Refills: 0 | Status: COMPLETED | OUTPATIENT
Start: 2019-01-26 | End: 2019-01-26

## 2019-01-26 RX ORDER — MORPHINE SULFATE 50 MG/1
4 CAPSULE, EXTENDED RELEASE ORAL ONCE
Qty: 0 | Refills: 0 | Status: DISCONTINUED | OUTPATIENT
Start: 2019-01-26 | End: 2019-01-26

## 2019-01-26 RX ADMIN — Medication 400 MILLIGRAM(S): at 09:43

## 2019-01-26 RX ADMIN — FAMOTIDINE 20 MILLIGRAM(S): 10 INJECTION INTRAVENOUS at 01:30

## 2019-01-26 RX ADMIN — ONDANSETRON 4 MILLIGRAM(S): 8 TABLET, FILM COATED ORAL at 00:17

## 2019-01-26 RX ADMIN — Medication 400 MILLIGRAM(S): at 11:12

## 2019-01-26 RX ADMIN — Medication 30 MILLILITER(S): at 02:41

## 2019-01-26 RX ADMIN — Medication 400 MILLIGRAM(S): at 01:31

## 2019-01-26 RX ADMIN — Medication 1 GRAM(S): at 02:41

## 2019-01-26 RX ADMIN — Medication 1000 MILLIGRAM(S): at 02:43

## 2019-01-26 NOTE — ED CDU PROVIDER INITIAL DAY NOTE - ATTENDING CONTRIBUTION TO CARE
Attending Statement: I have reviewed and agree with all pertinent clinical information, including history and physical exam and agree with treatment plan of the PA, except as noted.  24yo F no sig pmhx pw abdominal pain since yesterday. States pain started epigastric it "Moved to the right lower". Pain now "right side, described as "sharp pain comes in waves" wane/wax intensity, "very uncomfortable" associated with nausea. no vomit. no diarrhea. no dysuria no hematuria no urgency no vag bleeding, LMP 'Just finished" States she is not sexually active, no hx of STD. . Was seen in ED a day ago for same pain, neg ct, referred to OB. Today went to see "a new GYN" had a US "ok" told to go back to ED. Last ate at 2pm.   Vital signs noted. mmm. nontoxic, normal S1-S2 No resp distress. able to speak in full and clear sentences. no wheeze, rales or stridor. soft tender right periumbilical and RLQ, neg Weller, no cvat. pelvic done by AUSTYN DARDEN  pt evaluated and cleared by GYN and surgery. Placed in CDU  for serial abdominal exam and re assess in am.

## 2019-01-26 NOTE — ED CDU PROVIDER DISPOSITION NOTE - NSFOLLOWUPINSTRUCTIONS_ED_ALL_ED_FT
Advance activity as tolerated.  Continue all previously prescribed medications as directed unless otherwise instructed.  Take Tylenol 650mg (Two 325 mg pills) every 4-6 hours as needed for pain or fevers. Take Motrin (also sold as Advil or Ibuprofen) 400-600 mg (two or three 200 mg over the counter pills) every 8 hours as needed for moderate pain or fevers-- take with food. Follow up with your primary care physician and OB/GYN in 48-72 hours- bring copies of your results.  Return to the ER for worsening or persistent symptoms, including but not limited to worsening/persistent pain, fevers, vomiting, lightheadedness, passing out and/or ANY NEW OR CONCERNING SYMPTOMS. If you have issues obtaining follow up, please call: [phone] to obtain a doctor or specialist who takes your insurance in your area.  You may call [phone] to make an appointment with the internal medicine clinic.

## 2019-01-26 NOTE — ED CDU PROVIDER INITIAL DAY NOTE - OBJECTIVE STATEMENT
22 yo F, nonsmoker with PMH of ovarian cyst presents to c/o worsening RLQ pain today a/w nausea. Pt states she was seen in the ED yesterday for pain in mid abdomen, now pain is radiating to RLQ. Reports pain is sharp, sudden onset, intermittent, 10/10, worse with movements. Reports seen by different OBGYN today at the office, had transvaginal US, states it was normal and advised to go to ER to r/o appendicitis. Denies any fever, vomiting, diarrhea, constipation, chest pain, sob, back pain, dysuria, hematuria, vaginal bleeding, recent travel, or any other complaints.    CDU AUSTYN Asif: Agree with above. 23 year old female, PMHx of ovarian cyst, presents to the ED with persistent abdominal pain. Patient states she had sudden onset periumbilical/epigastric pain yesterday while she was at work. It was severe, stabbing and associated with diaphoresis and nausea. She was see in the ED with negative CT. Today, the pain has continued, but relocated to the right lower quadrant. The pain is 10/10, worse with movements, associated with nausea, one episode of NBNB vomiting. She saw her OB who did a TVUS which was normal and sent her back to the ED to r/o appendicitis. In the ED labs WNL. Pt seen and cleared by GYN and surgery. Pt brought to CDU for pain control, serial abdominal exams, re evaluation/observation.

## 2019-01-26 NOTE — CONSULT NOTE ADULT - ASSESSMENT
24yo  w/ LMP 19 presents to [facility] ED with abdominal pain.  Workup negative for ovarian torsion.  Small possibility of recently ruptured ovarian cyst as there was a right sided cyst noted on CTAP on  which was no longer appreciated on TVUS on , however, there is only trace (likely physiologic) free fluid in the pelvis.  Unclear etiology of abdominal pain, however, there is NO tenderness on pelvic or abdominal exam.      RECOMMENDATIONS:   - NSAIDs/Tylenol for pain control   - f/u w/ private OBGYN as needed   - r/o GI etiology of pain   - pt may benefit from general surgery consult   - NO acute GYN pathology, GYN team signing off, please re-consult if the pt develops new symptoms or findings suggestive of GYN process    Pt seen and discussed w/ Dr. Devlin PGY4 and Dr. Benites service attg  PAULIE Pereira MD PGY2

## 2019-01-26 NOTE — ED CDU PROVIDER DISPOSITION NOTE - CLINICAL COURSE
24 yo F with LMP 1/18/19 and upreg neg who presented with abdominal pain to the ED and sent to the CDU for observation and repeat physical exams since CT abdomen/pelvis was neg for acute appendicitis and transvaginal sono was neg for torsion and only found trace pelvis fluid. Surgery evaluated the pt and recommended sonogram of the appendix and the RUQ both of which were neg except for a 3mm gallbladder wall polyp. The patient's pain resolved and she is aware that she must followup for the 3mm polyp with repeat sonogram and outpt surgery followup.

## 2019-01-26 NOTE — ED CDU PROVIDER INITIAL DAY NOTE - PROGRESS NOTE DETAILS
Patient resting comfortably, no complaints at present. Will continue to monitor and observe, awaiting repeat labs in am. Pt notes improvement in pain.  Unable to visualize appendix on sono.  RUQ sono shows gallbladder polyp.  Pt tolerating PO.  Surgery aware of findings.  Pt medically stable for discharge.  Strict return precautions explained to patient.  Pt to follow up with PMD for reassess and for monitoring of RUQ sono findings.

## 2019-01-26 NOTE — CONSULT NOTE ADULT - SUBJECTIVE AND OBJECTIVE BOX
R2 GYN ED CONSULT NOTE    SUBJECTIVE:    24yo G_P_     Pt denies fever, chills, nausea, vomiting, diarrhea, headache, constipation, dizzyness, syncope, chest pain, palpitations, shortness of breath, dysuria, urgency, frequency, abdominal/pelvic pain, vaginal bleeding/discharge/odor/pain/itching, breast lumps/bumps, dyspareunia.    In ED today    Primary OB/GYN:  OBH:  GYNH: denies hx of fibroids, ov cysts, abnl paps, sti  PMSH:  MEDS: none  ALL: nkda  SOCH: denies t/e/d; safe at home  FAMH: denies fam hx of breast/uterine/ovarian/colon cancer  ROS: negative except per hpi    OBJECTIVE:    VITAL SIGNS:  Vital Signs Last 24 Hrs  T(C): 36.6 (2019 02:33), Max: 36.8 (2019 18:17)  T(F): 97.8 (2019 02:33), Max: 98.2 (2019 18:17)  HR: 59 (2019 02:33) (59 - 76)  BP: 98/66 (2019 02:33) (98/66 - 126/89)  BP(mean): --  RR: 16 (2019 02:33) (16 - 20)  SpO2: 100% (2019 02:33) (100% - 100%)    CAPILLARY BLOOD GLUCOSE            PHYSICAL EXAM:  GEN: NAD, A&Ox3  CV: RRR, no m/g/r  LUNGS: CTAB  ABD: soft, NT, ND, +BS  SPECULUM:  PELVIC:  EXT: WWP, no edema/TTP    LABS:                        12.0   4.03  )-----------( 231      ( 2019 20:57 )             36.4   baso 0.2    eos 2.2    imm gran 0.2    lymph 47.1   mono 5.7    poly 44.6                         11.8   3.71  )-----------( 204      ( 2019 13:40 )             35.9   baso 0.3    eos 1.9    imm gran 0.3    lymph 38.5   mono 9.2    poly 49.8           142  |  106  |  12  ----------------------------<  84  3.6   |  24  |  0.69    Ca    9.3      2019 20:57    TPro  7.5  /  Alb  4.5  /  TBili  0.4  /  DBili  x   /  AST  14  /  ALT  9   /  AlkPhos  46        Urinalysis Basic - ( 2019 00:15 )    Color: LIGHT YELLOW / Appearance: CLEAR / S.018 / pH: 6.5  Gluc: NEGATIVE / Ketone: MODERATE  / Bili: NEGATIVE / Urobili: NORMAL   Blood: NEGATIVE / Protein: NEGATIVE / Nitrite: NEGATIVE   Leuk Esterase: NEGATIVE / RBC: x / WBC x   Sq Epi: x / Non Sq Epi: x / Bacteria: x        RADIOLOGY:    ASSESSMENT:    RECOMMENDATIONS: R2 GYN ED CONSULT NOTE    SUBJECTIVE:    22yo  w/ LMP 19 presents to [facility] ED with abdominal pain.      Pt denies fever, chills, nausea, vomiting, diarrhea, headache, constipation, dizzyness, syncope, chest pain, palpitations, shortness of breath, dysuria, urgency, frequency, abdominal/pelvic pain, vaginal bleeding/discharge/odor/pain/itching, breast lumps/bumps, dyspareunia.    In ED today    Primary OB/GYN:  OBH:  GYNH: denies hx of fibroids, ov cysts, abnl paps, sti  PMSH:  MEDS: none  ALL: nkda  SOCH: denies t/e/d; safe at home  FAMH: denies fam hx of breast/uterine/ovarian/colon cancer  ROS: negative except per hpi    OBJECTIVE:    VITAL SIGNS:  Vital Signs Last 24 Hrs  T(C): 36.6 (2019 02:33), Max: 36.8 (2019 18:17)  T(F): 97.8 (2019 02:33), Max: 98.2 (2019 18:17)  HR: 59 (2019 02:33) (59 - 76)  BP: 98/66 (2019 02:33) (98/66 - 126/89)  BP(mean): --  RR: 16 (2019 02:33) (16 - 20)  SpO2: 100% (2019 02:33) (100% - 100%)    CAPILLARY BLOOD GLUCOSE            PHYSICAL EXAM:  GEN: NAD, A&Ox3  CV: RRR, no m/g/r  LUNGS: CTAB  ABD: soft, NT, ND, +BS  SPECULUM:  PELVIC:  EXT: WWP, no edema/TTP    LABS:                        12.0   4.03  )-----------( 231      ( 2019 20:57 )             36.4   baso 0.2    eos 2.2    imm gran 0.2    lymph 47.1   mono 5.7    poly 44.6                         11.8   3.71  )-----------( 204      ( 2019 13:40 )             35.9   baso 0.3    eos 1.9    imm gran 0.3    lymph 38.5   mono 9.2    poly 49.8       01-25    142  |  106  |  12  ----------------------------<  84  3.6   |  24  |  0.69    Ca    9.3      2019 20:57    TPro  7.5  /  Alb  4.5  /  TBili  0.4  /  DBili  x   /  AST  14  /  ALT  9   /  AlkPhos  46  01-25      Urinalysis Basic - ( 2019 00:15 )    Color: LIGHT YELLOW / Appearance: CLEAR / S.018 / pH: 6.5  Gluc: NEGATIVE / Ketone: MODERATE  / Bili: NEGATIVE / Urobili: NORMAL   Blood: NEGATIVE / Protein: NEGATIVE / Nitrite: NEGATIVE   Leuk Esterase: NEGATIVE / RBC: x / WBC x   Sq Epi: x / Non Sq Epi: x / Bacteria: x        RADIOLOGY:    ASSESSMENT:    RECOMMENDATIONS: R2 GYN ED CONSULT NOTE    SUBJECTIVE:    24yo  w/ LMP 19 presents to [facility] ED with abdominal pain.  Pt states that she was well until yesterday when she was at work and suddenly had severe epigastric pain that radiated to her central pelvis, prompting her to call EMS.  She presented to [facility] ED on  and workup including labs and CTAP were negative for acute illness, so she was discharged to home.  Then on  she says the pain moved to her RLQ and was still severe so she came back to ED.  The pt says the pain is 10/10 and has not been helped by pain meds in the ED including multiple pushes of IV morphine and toradol.  She never took pain meds at home.  She says the pain is slightly improved with she lays supine but is worsened by curling into a ball/sitting upright when it becomes accompanied by nausea.  Otherwise she denies N/V and has been able to tolerate po including soup the night of  and a small snack the afternoon of .  The pt is able to ambulate w/o difficulty.  She is having normal well formed BM but she says that her last BM on  aggravated the RLQ pain.  She is passing flatus.  She reports normal urination w/o dysuria.  She denies fevers/chills/SOB/chest pain/palpitations.  She reports a history of ruptured ovarian cyst several years ago and says this pain now feels similar to that.  Of note, the pt's history is signficant for multiple ED visits in the last few years including:    - 2018 for suicidal ideation   - 2018 for suicidal ideation   - 2018 for palpitations   - 3/9/2018 for suicide attempt w/ tylenol overdose   - 2016 for back pain   - 2016 for chest pain/dyspnea   - 2016 for toothache    Primary OB/GYN: Belen Lopez  OBH: surgical TOPx2 (, ), pLTCS  (unknown cause)  GYNH: denies hx of fibroids, abnl paps, sti  PMSH: pt denies; depression per EMR  MEDS: none  ALL: percocet (itching)  SOCH: social alcohol; denies t/d  ROS: negative except per hpi    OBJECTIVE:    VITAL SIGNS:  Vital Signs Last 24 Hrs  T(C): 36.6 (2019 02:33), Max: 36.8 (2019 18:17)  T(F): 97.8 (2019 02:33), Max: 98.2 (2019 18:17)  HR: 59 (2019 02:33) (59 - 76)  BP: 98/66 (2019 02:33) (98/66 - 126/89)  RR: 16 (2019 02:33) (16 - 20)  SpO2: 100% (2019 02:33) (100% - 100%)    PHYSICAL EXAM:  GEN: NAD, A&Ox3  CV: RRR, no m/g/r  LUNGS: CTAB  ABD: soft, NONTENDER, ND, +BS  SPECULUM: no VB, normal cervix/vagina, no lesions, normal discharge  PELVIC: b/l adnexa are NONTENDER, NO CMT, normal uterus, no palpable adnexal masses  EXT: WWP, no edema/TTP    LABS:                        12.0   4.03  )-----------( 231      ( 2019 20:57 )             36.4   baso 0.2    eos 2.2    imm gran 0.2    lymph 47.1   mono 5.7    poly 44.6                         11.8   3.71  )-----------( 204      ( 2019 13:40 )             35.9   baso 0.3    eos 1.9    imm gran 0.3    lymph 38.5   mono 9.2    poly 49.8           142  |  106  |  12  ----------------------------<  84  3.6   |  24  |  0.69    Ca    9.3      2019 20:57    TPro  7.5  /  Alb  4.5  /  TBili  0.4  /  DBili  x   /  AST  14  /  ALT  9   /  AlkPhos  46        Urinalysis Basic - ( 2019 00:15 )    Color: LIGHT YELLOW / Appearance: CLEAR / S.018 / pH: 6.5  Gluc: NEGATIVE / Ketone: MODERATE  / Bili: NEGATIVE / Urobili: NORMAL   Blood: NEGATIVE / Protein: NEGATIVE / Nitrite: NEGATIVE   Leuk Esterase: NEGATIVE / RBC: x / WBC x   Sq Epi: x / Non Sq Epi: x / Bacteria: x    RADIOLOGY:    < from: US Doppler Pelvis (19 @ 23:39) >  EXAM:  US TRANSVAGINAL    EXAM:  US DPLX PELVIC    PROCEDURE DATE:  2019   INTERPRETATION:  CLINICAL INFORMATION: Right lower quadrant pain, evaluate for ovarian torsion. Negative pregnancy test.  LMP: 2019  COMPARISON: None   TECHNIQUE: Transabdominal and Endovaginal pelvic sonogram. Color and Spectral Doppler was performed.  FINDINGS:  Uterus: 7.8 x 4.8 x 6.0 cm. Within normal limits.  Endometrium: 10 mm. Within normal limits.  Ovaries:   Right ovary measures 3.4 x 2.4 x 2.7 cm. Normal Doppler flow and spectral waveforms.   Left ovary measures 3.2 and 2.0 x 2.2 cm. Normal Doppler flow and spectral waveforms.  Free fluid: Small volume free fluid in the left adnexa and cul-de-sac.    IMPRESSION:  No ovarian torsion. Small amount of free fluid, likely physiologic.    ODALIS RAMOS M.D., RADIOLOGY RESIDENT  This document has been electronically signed.  LEI GUERRERO M.D., ATTENDING RADIOLOGIST  This document has been electronically signed. 2019  2:17AM  < end of copied text >    < from: CT Abdomen and Pelvis w/ IV Cont (19 @ 15:38) >  EXAM:  CT ABDOMEN AND PELVIS IC    PROCEDURE DATE:  2019   INTERPRETATION:  CLINICAL INFORMATION: Abdominal pain. Periumbilical pain migrating to lower abdomen with a right lower quadrant and left lower quadrant tenderness for one day.  COMPARISON: Correlate with abdominal ultrasound 3/10/2018. Pelvis ultrasound 2017.  PROCEDURE:  CT of the Abdomen and Pelvis was performed with intravenous contrast. Intravenous contrast: 90 ml Omnipaque 350. 10 ml discarded. Oral contrast: None. Sagittal and coronal reformats were performed.  FINDINGS:  LOWER CHEST: Within normal limits.  LIVER: Within normal limits.  BILE DUCTS: Normal caliber.  GALLBLADDER: Within normal limits.  SPLEEN: Within normal limits.  PANCREAS: Within normal limits.  ADRENALS: Within normal limits.  KIDNEYS/URETERS: Within normal limits.  BLADDER: Within normal limits.  REPRODUCTIVE ORGANS: Fibroid uterus. Right corpus luteal cyst. Trace free fluid in the cul-de-sac, likely physiologic.  BOWEL: No bowel obstruction. Appendix is normal.  PERITONEUM: No ascites.  VESSELS: Within normal limits.  RETROPERITONEUM: No lymphadenopathy.    ABDOMINAL WALL: Within normal limits.  BONES: Within normal limits.  IMPRESSION: No acute intra-abdominal or pelvic pathology.  Right corpus luteal cyst.    DEBBIE GUAN M.D., RADIOLOGY RESIDENT  This document has been electronically signed.  CINDY BAUGH M.D., ATTENDING RADIOLOGIST  This document has been electronically signed. 2019  4:26PM    < end of copied text >

## 2019-01-26 NOTE — CONSULT NOTE ADULT - ATTENDING COMMENTS
Pt seen and examined.  Agree with resident eval and plan.  CT shows normal appendix.  Now pain free and nontender on exam.  US of abdomen and gallbladder to rule out cholelithiasis.

## 2019-01-26 NOTE — ED CDU PROVIDER INITIAL DAY NOTE - CHPI ED SYMPTOMS NEG
no hematuria/no chills/no dysuria/no abdominal distension/no blood in stool/no burning urination/no fever
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
## 2019-01-28 LAB
C TRACH RRNA SPEC QL NAA+PROBE: SIGNIFICANT CHANGE UP
N GONORRHOEA RRNA SPEC QL NAA+PROBE: SIGNIFICANT CHANGE UP
SPECIMEN SOURCE: SIGNIFICANT CHANGE UP

## 2019-01-29 ENCOUNTER — MOBILE ON CALL (OUTPATIENT)
Age: 24
End: 2019-01-29

## 2019-01-29 RX ORDER — IBUPROFEN 800 MG/1
800 TABLET, FILM COATED ORAL
Qty: 90 | Refills: 0 | Status: DISCONTINUED | COMMUNITY
Start: 2018-05-10 | End: 2019-01-29

## 2019-01-29 RX ORDER — MELOXICAM 15 MG/1
15 TABLET ORAL DAILY
Qty: 30 | Refills: 1 | Status: DISCONTINUED | COMMUNITY
Start: 2019-01-14 | End: 2019-01-29

## 2019-01-29 RX ORDER — DICLOFENAC SODIUM 75 MG/1
75 TABLET, DELAYED RELEASE ORAL
Qty: 60 | Refills: 0 | Status: DISCONTINUED | COMMUNITY
Start: 2018-06-05 | End: 2019-01-29

## 2019-02-02 ENCOUNTER — RESULT REVIEW (OUTPATIENT)
Age: 24
End: 2019-02-02

## 2019-02-11 ENCOUNTER — APPOINTMENT (OUTPATIENT)
Dept: ORTHOPEDIC SURGERY | Facility: CLINIC | Age: 24
End: 2019-02-11
Payer: COMMERCIAL

## 2019-02-11 DIAGNOSIS — K29.70 GASTRITIS, UNSPECIFIED, W/OUT BLEEDING: ICD-10-CM

## 2019-02-11 PROBLEM — N83.209 UNSPECIFIED OVARIAN CYST, UNSPECIFIED SIDE: Chronic | Status: ACTIVE | Noted: 2019-01-26

## 2019-02-11 PROCEDURE — 99214 OFFICE O/P EST MOD 30 MIN: CPT | Mod: 25

## 2019-02-11 PROCEDURE — 20604 DRAIN/INJ JOINT/BURSA W/US: CPT | Mod: RT

## 2019-02-15 NOTE — PHYSICAL EXAM
[de-identified] : General: Patient is awake and alert, demonstrates appropriate mood and affect, exhibits normal breathing and is in no acute distress.\par Psych:  The patient is appropriately dressed and groomed, maintains good eye contact.  Alert and oriented x 3.  Normal attention/concentration\par Skin: The patient has no chronic skin lesions, rashes, or ulcers.  There is no induration or erythema of uninvolved extremities.  For skin exam of involved extremity refer to detailed musculoskeletal/extremity exam. \par Respiratory: The patient is in no apparent respiratory distress. They're taking full deep breaths with normal excursion, without use of accessory muscles or evidence of audible wheezes or stridor without the use of a stethoscope. \par Neurological: 5/5 motor strength and sensation intact throughout uninvolved upper and lower extremities, refer to detailed musculoskeletal exam regarding involved extremity.\par \par Right foot \par There is swelling over the right mid foot that is not present on the left\par There is moderate to severe tenderness to palpation over the first tarsometatarsal joint extending over the Lisfranc interval. There is no mechanical instability of the first tarsometatarsal joint it is nonexistent on the contralateral side. This provided the patient with significant pain. Patient is unable to single limb heel rise or single limb hop secondary to pain about her medial column. She also is unable to toe walk.\par Positive Tinel's about the dorsalis pedis nerve\par Appearance: Skin is intact. There is no skin breakdown. There were no lesions. There is no erythema.\par Vascular: The patient has a warm and well-perfused foot. There is a palpable 2+ dorsalis pedis and posterior tibialis artery. There is brisk capillary refill to all 5 digits.\par Sensation intact to light touch in saphenous, sural, superficial peroneal, deep peroneal, and tibial nerve distributions. \par Motors: Patient is able to fire extensor hallucis longus, flexor hallucis longus, tibialis anterior, gastrocsoleus complex. The patient has 5 out of 5 strength in all myotomes.\par Lymph: No evidence of venous stasis ulcers. No lymphedema. No pitting edema. \par  [de-identified] : Weightbearing X-rays three-views right foot were ordered, obtained and interpreted by me today and reviewed with the patient showing: Plantar gapping of the first tarsometatarsal joint. There is irregular articulation of the first TMT joint consistent with prior impaction fracture versus degenerative arthrosis\par
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
## 2019-03-04 ENCOUNTER — TRANSCRIPTION ENCOUNTER (OUTPATIENT)
Age: 24
End: 2019-03-04

## 2019-03-11 ENCOUNTER — APPOINTMENT (OUTPATIENT)
Dept: ORTHOPEDIC SURGERY | Facility: CLINIC | Age: 24
End: 2019-03-11
Payer: COMMERCIAL

## 2019-03-11 DIAGNOSIS — Z80.8 FAMILY HISTORY OF MALIGNANT NEOPLASM OF OTHER ORGANS OR SYSTEMS: ICD-10-CM

## 2019-03-11 DIAGNOSIS — S93.621A SPRAIN OF TARSOMETATARSAL LIGAMENT OF RIGHT FOOT, INITIAL ENCOUNTER: ICD-10-CM

## 2019-03-11 PROCEDURE — 99214 OFFICE O/P EST MOD 30 MIN: CPT

## 2019-03-11 RX ORDER — DICLOFENAC SODIUM 10 MG/G
1 GEL TOPICAL DAILY
Qty: 1 | Refills: 0 | Status: DISCONTINUED | COMMUNITY
Start: 2019-02-11 | End: 2019-03-11

## 2019-03-22 ENCOUNTER — APPOINTMENT (OUTPATIENT)
Dept: PHYSICAL MEDICINE AND REHAB | Facility: CLINIC | Age: 24
End: 2019-03-22
Payer: COMMERCIAL

## 2019-03-22 VITALS
HEIGHT: 65 IN | DIASTOLIC BLOOD PRESSURE: 72 MMHG | SYSTOLIC BLOOD PRESSURE: 118 MMHG | WEIGHT: 139 LBS | HEART RATE: 107 BPM | BODY MASS INDEX: 23.16 KG/M2

## 2019-03-22 PROCEDURE — 99244 OFF/OP CNSLTJ NEW/EST MOD 40: CPT

## 2019-03-22 NOTE — REVIEW OF SYSTEMS
[Joint Pain] : joint pain [Joint Stiffness] : joint stiffness [Muscle Weakness] : muscle weakness [Negative] : Heme/Lymph
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
## 2019-03-22 NOTE — PHYSICAL EXAM
[Normal] : Deep tendon reflexes were 2+ and symmetric, the sensory exam was normal to light touch and pinprick and no focal deficits [FreeTextEntry1] : Right lower extremity\par There is swelling and tenderness to palpation about the first tarsometatarsal joint both on the dorsal and medial aspect of the tarsometatarsal joint.\par There is  tenderness to palpation over the Lisfranc interval as well as the second and third tarsometatarsal joints.\par No tenderness to palpation about the cuboid tarsometatarsal joint\par There is significant pain with instability exam of the first tarsometatarsal joint with a audible and palpable mechanical click that causes the patient significant pain.\par There is irritation of the dorsalis pedis nerve with a positive Tinel's over the dorsum of the foot. The dorsalis pedis nerve is burning per the patient\par Unable to single limb heel rise\par Avoids the medial column with ambulation. Primary heel weightbearing\par Appearance: Skin is intact. There is no skin breakdown. There were no lesions. There is no erythema.\par Vascular: The patient has a warm and well-per fused foot. There is a palpable 2+ dorsalis pedis \par gait antalgic \par numb medial dorsal and sole with tingling \par \par no long tract signs \par B/B neg \par  [de-identified] : abn -gait impaired

## 2019-03-22 NOTE — ASSESSMENT
[FreeTextEntry1] : plan as follows\par Start PT [facility] near home or work -[place] or [place]\par needs pre op conditioning and intrinsic strengthening \par rec EMG NCS rule out TTS \par to return for test \par cont gabapentin \par

## 2019-03-22 NOTE — CONSULT LETTER
[FreeTextEntry1] : Dear  , \par \par I had the pleasure of evaluating your patient, ZAMZAM DELGADO .\par \par Thank you very much for allowing me to participate in the care of this patient. If you have any questions, please do not hesitate to contact me. \par \par Sincerely, \par Wesley Woods MD \par \par ABPMR Board Certified in Physical Medicine and Rehabilitation\par Certified Fellow of AANEM (Neuromuscular and Electrodiagnostic Medicine)\par Subspecialty certified in Sports Medicine (ABPMR)\par \par  of Physical Medicine and Rehabilitation\par [facility] [employer] [facility]\par [facility]\par \par

## 2019-04-15 ENCOUNTER — APPOINTMENT (OUTPATIENT)
Dept: PHYSICAL MEDICINE AND REHAB | Facility: CLINIC | Age: 24
End: 2019-04-15

## 2019-04-19 ENCOUNTER — APPOINTMENT (OUTPATIENT)
Dept: PHYSICAL MEDICINE AND REHAB | Facility: CLINIC | Age: 24
End: 2019-04-19
Payer: COMMERCIAL

## 2019-04-19 VITALS
BODY MASS INDEX: 23.16 KG/M2 | HEART RATE: 111 BPM | HEIGHT: 65 IN | SYSTOLIC BLOOD PRESSURE: 114 MMHG | DIASTOLIC BLOOD PRESSURE: 82 MMHG | WEIGHT: 139 LBS

## 2019-04-19 PROCEDURE — 95886 MUSC TEST DONE W/N TEST COMP: CPT | Mod: 59

## 2019-04-19 PROCEDURE — 95911 NRV CNDJ TEST 9-10 STUDIES: CPT

## 2019-04-22 ENCOUNTER — OTHER (OUTPATIENT)
Age: 24
End: 2019-04-22

## 2019-04-22 RX ORDER — CAMPHOR 0.45 %
25 GEL (GRAM) TOPICAL
Qty: 120 | Refills: 0 | Status: ACTIVE | COMMUNITY
Start: 2019-04-22 | End: 1900-01-01

## 2019-04-23 ENCOUNTER — APPOINTMENT (OUTPATIENT)
Age: 24
End: 2019-04-23
Payer: COMMERCIAL

## 2019-04-25 ENCOUNTER — APPOINTMENT (OUTPATIENT)
Age: 24
End: 2019-04-25
Payer: COMMERCIAL

## 2019-04-25 PROCEDURE — 77071 MNL APPL STRS JT RADIOGRAPHY: CPT | Mod: RT

## 2019-04-25 PROCEDURE — 28465 OPTX TARSAL BONE FX EACH: CPT | Mod: AS,RT

## 2019-04-25 PROCEDURE — 28740 FUSION OF FOOT BONES: CPT | Mod: RT

## 2019-04-25 PROCEDURE — 28740 FUSION OF FOOT BONES: CPT | Mod: AS,RT

## 2019-04-25 PROCEDURE — 28465 OPTX TARSAL BONE FX EACH: CPT | Mod: RT

## 2019-04-25 PROCEDURE — 20900 REMOVAL OF BONE FOR GRAFT: CPT | Mod: RT

## 2019-04-25 PROCEDURE — 77071 MNL APPL STRS JT RADIOGRAPHY: CPT

## 2019-05-05 ENCOUNTER — FORM ENCOUNTER (OUTPATIENT)
Age: 24
End: 2019-05-05

## 2019-05-06 ENCOUNTER — APPOINTMENT (OUTPATIENT)
Dept: RADIOLOGY | Facility: CLINIC | Age: 24
End: 2019-05-06

## 2019-05-06 ENCOUNTER — OUTPATIENT (OUTPATIENT)
Dept: OUTPATIENT SERVICES | Facility: HOSPITAL | Age: 24
LOS: 1 days | End: 2019-05-06
Payer: COMMERCIAL

## 2019-05-06 ENCOUNTER — APPOINTMENT (OUTPATIENT)
Dept: ORTHOPEDIC SURGERY | Facility: CLINIC | Age: 24
End: 2019-05-06
Payer: COMMERCIAL

## 2019-05-06 DIAGNOSIS — M75.100 UNSPECIFIED ROTATOR CUFF TEAR OR RUPTURE OF UNSPECIFIED SHOULDER, NOT SPECIFIED AS TRAUMATIC: Chronic | ICD-10-CM

## 2019-05-06 DIAGNOSIS — Z98.89 OTHER SPECIFIED POSTPROCEDURAL STATES: Chronic | ICD-10-CM

## 2019-05-06 PROCEDURE — 73630 X-RAY EXAM OF FOOT: CPT | Mod: 26,RT

## 2019-05-06 PROCEDURE — 29405 APPL SHORT LEG CAST: CPT | Mod: 58

## 2019-05-06 PROCEDURE — 99024 POSTOP FOLLOW-UP VISIT: CPT

## 2019-05-06 PROCEDURE — 73630 X-RAY EXAM OF FOOT: CPT

## 2019-05-06 RX ORDER — ONDANSETRON 4 MG/1
4 TABLET ORAL EVERY 8 HOURS
Qty: 20 | Refills: 0 | Status: DISCONTINUED | COMMUNITY
Start: 2019-04-22 | End: 2019-05-06

## 2019-05-06 RX ORDER — OXYCODONE 5 MG/1
5 TABLET ORAL
Qty: 50 | Refills: 0 | Status: DISCONTINUED | COMMUNITY
Start: 2019-03-20 | End: 2019-05-06

## 2019-06-02 ENCOUNTER — FORM ENCOUNTER (OUTPATIENT)
Age: 24
End: 2019-06-02

## 2019-06-03 ENCOUNTER — APPOINTMENT (OUTPATIENT)
Dept: ORTHOPEDIC SURGERY | Facility: CLINIC | Age: 24
End: 2019-06-03
Payer: COMMERCIAL

## 2019-06-03 ENCOUNTER — OUTPATIENT (OUTPATIENT)
Dept: OUTPATIENT SERVICES | Facility: HOSPITAL | Age: 24
LOS: 1 days | End: 2019-06-03
Payer: COMMERCIAL

## 2019-06-03 ENCOUNTER — APPOINTMENT (OUTPATIENT)
Dept: RADIOLOGY | Facility: CLINIC | Age: 24
End: 2019-06-03
Payer: COMMERCIAL

## 2019-06-03 DIAGNOSIS — Z98.89 OTHER SPECIFIED POSTPROCEDURAL STATES: Chronic | ICD-10-CM

## 2019-06-03 DIAGNOSIS — M75.100 UNSPECIFIED ROTATOR CUFF TEAR OR RUPTURE OF UNSPECIFIED SHOULDER, NOT SPECIFIED AS TRAUMATIC: Chronic | ICD-10-CM

## 2019-06-03 PROCEDURE — 73630 X-RAY EXAM OF FOOT: CPT | Mod: 26,RT

## 2019-06-03 PROCEDURE — 99024 POSTOP FOLLOW-UP VISIT: CPT

## 2019-06-03 PROCEDURE — 73630 X-RAY EXAM OF FOOT: CPT

## 2019-06-10 ENCOUNTER — APPOINTMENT (OUTPATIENT)
Dept: ORTHOPEDIC SURGERY | Facility: CLINIC | Age: 24
End: 2019-06-10

## 2019-06-21 ENCOUNTER — OTHER (OUTPATIENT)
Age: 24
End: 2019-06-21

## 2019-06-23 ENCOUNTER — EMERGENCY (EMERGENCY)
Facility: HOSPITAL | Age: 24
LOS: 1 days | Discharge: HOME | End: 2019-06-23
Attending: EMERGENCY MEDICINE | Admitting: EMERGENCY MEDICINE
Payer: COMMERCIAL

## 2019-06-23 VITALS
OXYGEN SATURATION: 100 % | SYSTOLIC BLOOD PRESSURE: 129 MMHG | DIASTOLIC BLOOD PRESSURE: 80 MMHG | RESPIRATION RATE: 16 BRPM | TEMPERATURE: 98 F | HEART RATE: 112 BPM

## 2019-06-23 VITALS
OXYGEN SATURATION: 100 % | HEART RATE: 94 BPM | SYSTOLIC BLOOD PRESSURE: 116 MMHG | TEMPERATURE: 99 F | RESPIRATION RATE: 18 BRPM | DIASTOLIC BLOOD PRESSURE: 75 MMHG

## 2019-06-23 DIAGNOSIS — M75.100 UNSPECIFIED ROTATOR CUFF TEAR OR RUPTURE OF UNSPECIFIED SHOULDER, NOT SPECIFIED AS TRAUMATIC: Chronic | ICD-10-CM

## 2019-06-23 DIAGNOSIS — Z98.89 OTHER SPECIFIED POSTPROCEDURAL STATES: Chronic | ICD-10-CM

## 2019-06-23 LAB
HIV COMBO RESULT: SIGNIFICANT CHANGE UP
HIV1+2 AB SPEC QL: SIGNIFICANT CHANGE UP

## 2019-06-23 PROCEDURE — 99284 EMERGENCY DEPT VISIT MOD MDM: CPT | Mod: 25

## 2019-06-23 RX ORDER — DEXAMETHASONE 0.5 MG/5ML
10 ELIXIR ORAL ONCE
Refills: 0 | Status: COMPLETED | OUTPATIENT
Start: 2019-06-23 | End: 2019-06-23

## 2019-06-23 RX ORDER — ACETAMINOPHEN 500 MG
975 TABLET ORAL ONCE
Refills: 0 | Status: COMPLETED | OUTPATIENT
Start: 2019-06-23 | End: 2019-06-23

## 2019-06-23 RX ORDER — PENICILLIN G BENZATHINE 1200000 [IU]/2ML
1.2 INJECTION, SUSPENSION INTRAMUSCULAR ONCE
Refills: 0 | Status: COMPLETED | OUTPATIENT
Start: 2019-06-23 | End: 2019-06-23

## 2019-06-23 RX ADMIN — Medication 975 MILLIGRAM(S): at 07:10

## 2019-06-23 RX ADMIN — PENICILLIN G BENZATHINE 1.2 MILLION UNIT(S): 1200000 INJECTION, SUSPENSION INTRAMUSCULAR at 07:11

## 2019-06-23 RX ADMIN — Medication 10 MILLIGRAM(S): at 07:10

## 2019-06-23 NOTE — ED PROVIDER NOTE - OBJECTIVE STATEMENT
Pertinent HPI/PMH/PSH/FHx/SHx and Review of Systems contained within  HPI:  23y F  p/w CC sore throat x2days. also having mild gradual onset headache, body aches and lightheadedness.  Last night she noticed "white spots" on her right tonsil and lymph node swollen on the right side with difficulty swallowing solids. able to tolerate liquids. no neck stiffness, no drooling. on ciprofloxacin for uti  PMH/PSH: asthma  ROS negative for: fever, Chest pain, SOB, Nausea, vomiting, diarrhea, abdominal pain,   FamilyHx and SocialHx not otherwise contributory

## 2019-06-23 NOTE — ED PROVIDER NOTE - CLINICAL SUMMARY MEDICAL DECISION MAKING FREE TEXT BOX
enlarged b/l tonsils, white exudate R tonsils, anterior cervical LAD that is ttp. centor 3, likely strep throat. pt requesting rapid strep but will send culture since she is concerned she might've transmitted to her son.

## 2019-06-23 NOTE — ED ADULT NURSE NOTE - INTERVENTIONS DEFINITIONS
Bern to call system/Room bathroom lighting operational/Call bell, personal items and telephone within reach/Instruct patient to call for assistance

## 2019-06-23 NOTE — ED ADULT NURSE NOTE - OBJECTIVE STATEMENT
pt on bed aox3 c/o sore throat with exudate on Right side, generalized body ache weakness, difficulty swallowing, talking, Headache dizziness since yesterday and worsening today. Denies CP SOB cough fever chills PMHX Ovarian cyst Sciatica Asthma waiting for MD to [place]. will monitor

## 2019-06-23 NOTE — ED PROVIDER NOTE - NSFOLLOWUPINSTRUCTIONS_ED_ALL_ED_FT
FOLLOW UP WITH PMD  WITHIN 1-2DAYS, CALL TO MAKE APPOINTMENT  COME BACK TO ED IF YOUR CONDITION WORSENS OR IF YOU DEVELOP FEVER GREATER THAN 100.4F, CHEST PAIN,  SHORTNESS OF BREATH, DROOLING, SEVERE NECK PAIN, DIFFICULTY SWALLOWING LIQUIDS OR ANY OTHER SYMPTOMS CONCERNING TO YOU  TAKE TYLENOL (ACETAMINOPHEN) 650 MG EVERY 6 HOURS AS NEEDED FOR PAIN  TAKE IBUPROFEN (MOTRIN)  600 MG EVERY 6 HOURS AS NEEDED FOR PAIN  IF YOU WERE PRESRCIBED ANY MEDICATIONS FROM TODAY'S VISIT, TAKE THEM AS DIRECTED

## 2019-06-23 NOTE — ED PROVIDER NOTE - ATTENDING CONTRIBUTION TO CARE
23y F hx recent midfoot fusion surg now p/w R>L pharyngeal pain, subj fever, halitosis.  no cough.  +contact at home w/ same.    VS: afebrile, WNL  Gen:  Well appearning in NAD  Head:  NC/AT  HEENT: R>L pharyngeal erythema w/exudates  Resp: No distress   Heme: +ant tender cervical LAD  Ext: no deformities  Skin: warm and dry as visualized     strep fufilling centor.  will tx w/bicillin

## 2019-06-23 NOTE — ED ADULT NURSE NOTE - DRUG PRE-SCREENING (DAST -1)
Statement Selected Clinical photo was taken today for comparison. If not completely healed will consider LN2 vs biopsy. Detail Level: Zone

## 2019-06-23 NOTE — ED ADULT TRIAGE NOTE - CHIEF COMPLAINT QUOTE
Presents for sore throat.  Patient developed sore throat with headache, body aches and dizziness on Friday.  Last night she noticed "white spots" on her right tonsil and lymph node swollen on the right side with difficulty swallowing.  Her son has similar symptoms.  She is currently taking Cipro for UTI.  Appears comfortable and in no apparent distress.

## 2019-06-24 LAB — SPECIMEN SOURCE: SIGNIFICANT CHANGE UP

## 2019-06-25 LAB — S PYO SPEC QL CULT: SIGNIFICANT CHANGE UP

## 2019-07-02 ENCOUNTER — FORM ENCOUNTER (OUTPATIENT)
Age: 24
End: 2019-07-02

## 2019-07-03 ENCOUNTER — APPOINTMENT (OUTPATIENT)
Dept: ORTHOPEDIC SURGERY | Facility: CLINIC | Age: 24
End: 2019-07-03
Payer: COMMERCIAL

## 2019-07-03 ENCOUNTER — APPOINTMENT (OUTPATIENT)
Dept: RADIOLOGY | Facility: CLINIC | Age: 24
End: 2019-07-03

## 2019-07-03 ENCOUNTER — OUTPATIENT (OUTPATIENT)
Dept: OUTPATIENT SERVICES | Facility: HOSPITAL | Age: 24
LOS: 1 days | End: 2019-07-03
Payer: COMMERCIAL

## 2019-07-03 DIAGNOSIS — M75.100 UNSPECIFIED ROTATOR CUFF TEAR OR RUPTURE OF UNSPECIFIED SHOULDER, NOT SPECIFIED AS TRAUMATIC: Chronic | ICD-10-CM

## 2019-07-03 DIAGNOSIS — Z98.89 OTHER SPECIFIED POSTPROCEDURAL STATES: Chronic | ICD-10-CM

## 2019-07-03 PROCEDURE — 73630 X-RAY EXAM OF FOOT: CPT | Mod: 26,RT

## 2019-07-03 PROCEDURE — 99024 POSTOP FOLLOW-UP VISIT: CPT

## 2019-07-03 PROCEDURE — 73630 X-RAY EXAM OF FOOT: CPT

## 2019-07-03 RX ORDER — DOCUSATE SODIUM 100 MG/1
100 CAPSULE ORAL TWICE DAILY
Qty: 60 | Refills: 0 | Status: DISCONTINUED | COMMUNITY
Start: 2019-04-22 | End: 2019-07-03

## 2019-07-03 RX ORDER — ASPIRIN 325 MG/1
325 TABLET, COATED ORAL
Qty: 60 | Refills: 0 | Status: DISCONTINUED | COMMUNITY
Start: 2019-04-22 | End: 2019-07-03

## 2019-07-03 RX ORDER — ASPIRIN 325 MG/1
325 TABLET, FILM COATED ORAL TWICE DAILY
Qty: 60 | Refills: 0 | Status: DISCONTINUED | COMMUNITY
Start: 2019-04-22 | End: 2019-07-03

## 2019-07-03 RX ORDER — MORPHINE SULFATE 15 MG/1
15 TABLET, FILM COATED, EXTENDED RELEASE ORAL
Qty: 20 | Refills: 0 | Status: DISCONTINUED | COMMUNITY
Start: 2019-04-26 | End: 2019-07-03

## 2019-07-03 RX ORDER — TRAMADOL HYDROCHLORIDE 50 MG/1
50 TABLET, COATED ORAL 3 TIMES DAILY
Qty: 21 | Refills: 0 | Status: ACTIVE | COMMUNITY
Start: 2019-07-03 | End: 1900-01-01

## 2019-07-30 ENCOUNTER — FORM ENCOUNTER (OUTPATIENT)
Age: 24
End: 2019-07-30

## 2019-07-31 ENCOUNTER — APPOINTMENT (OUTPATIENT)
Dept: RADIOLOGY | Facility: CLINIC | Age: 24
End: 2019-07-31

## 2019-07-31 ENCOUNTER — APPOINTMENT (OUTPATIENT)
Dept: ORTHOPEDIC SURGERY | Facility: CLINIC | Age: 24
End: 2019-07-31
Payer: COMMERCIAL

## 2019-07-31 ENCOUNTER — OUTPATIENT (OUTPATIENT)
Dept: OUTPATIENT SERVICES | Facility: HOSPITAL | Age: 24
LOS: 1 days | End: 2019-07-31
Payer: COMMERCIAL

## 2019-07-31 VITALS — HEIGHT: 65 IN | RESPIRATION RATE: 16 BRPM | WEIGHT: 139 LBS | BODY MASS INDEX: 23.16 KG/M2

## 2019-07-31 DIAGNOSIS — M24.271 DISORDER OF LIGAMENT, RIGHT ANKLE: ICD-10-CM

## 2019-07-31 DIAGNOSIS — Z98.89 OTHER SPECIFIED POSTPROCEDURAL STATES: Chronic | ICD-10-CM

## 2019-07-31 DIAGNOSIS — M75.100 UNSPECIFIED ROTATOR CUFF TEAR OR RUPTURE OF UNSPECIFIED SHOULDER, NOT SPECIFIED AS TRAUMATIC: Chronic | ICD-10-CM

## 2019-07-31 DIAGNOSIS — T14.8XXA OTHER INJURY OF UNSPECIFIED BODY REGION, INITIAL ENCOUNTER: ICD-10-CM

## 2019-07-31 DIAGNOSIS — M19.071 PRIMARY OSTEOARTHRITIS, RIGHT ANKLE AND FOOT: ICD-10-CM

## 2019-07-31 PROCEDURE — 73610 X-RAY EXAM OF ANKLE: CPT

## 2019-07-31 PROCEDURE — 73630 X-RAY EXAM OF FOOT: CPT | Mod: 26,RT

## 2019-07-31 PROCEDURE — 73630 X-RAY EXAM OF FOOT: CPT

## 2019-07-31 PROCEDURE — 73610 X-RAY EXAM OF ANKLE: CPT | Mod: 26,RT

## 2019-07-31 PROCEDURE — 99214 OFFICE O/P EST MOD 30 MIN: CPT

## 2019-08-07 DIAGNOSIS — M79.671 PAIN IN RIGHT FOOT: ICD-10-CM

## 2019-08-07 DIAGNOSIS — Z98.1 ARTHRODESIS STATUS: ICD-10-CM

## 2019-08-16 ENCOUNTER — APPOINTMENT (OUTPATIENT)
Dept: ORTHOPEDIC SURGERY | Facility: CLINIC | Age: 24
End: 2019-08-16
Payer: COMMERCIAL

## 2019-08-16 VITALS — RESPIRATION RATE: 16 BRPM | WEIGHT: 139 LBS | HEIGHT: 65 IN | BODY MASS INDEX: 23.16 KG/M2

## 2019-08-16 DIAGNOSIS — S90.121A CONTUSION OF RIGHT LESSER TOE(S) W/OUT DAMAGE TO NAIL, INITIAL ENCOUNTER: ICD-10-CM

## 2019-08-16 DIAGNOSIS — S92.241G: ICD-10-CM

## 2019-08-16 PROCEDURE — 99214 OFFICE O/P EST MOD 30 MIN: CPT

## 2019-08-16 RX ORDER — GABAPENTIN 300 MG/1
300 CAPSULE ORAL 3 TIMES DAILY
Qty: 90 | Refills: 2 | Status: ACTIVE | COMMUNITY
Start: 2019-08-16 | End: 1900-01-01

## 2019-08-20 ENCOUNTER — RESULT REVIEW (OUTPATIENT)
Age: 24
End: 2019-08-20

## 2019-08-20 LAB
BASOPHILS # BLD AUTO: 0.01 K/UL
BASOPHILS NFR BLD AUTO: 0.3 %
CRP SERPL-MCNC: <0.1 MG/DL
EOSINOPHIL # BLD AUTO: 0.06 K/UL
EOSINOPHIL NFR BLD AUTO: 1.7 %
ERYTHROCYTE [SEDIMENTATION RATE] IN BLOOD BY WESTERGREN METHOD: 11 MM/HR
HCT VFR BLD CALC: 39.3 %
HGB BLD-MCNC: 12.6 G/DL
IMM GRANULOCYTES NFR BLD AUTO: 0 %
LYMPHOCYTES # BLD AUTO: 1.39 K/UL
LYMPHOCYTES NFR BLD AUTO: 40.2 %
MAN DIFF?: NORMAL
MCHC RBC-ENTMCNC: 30.7 PG
MCHC RBC-ENTMCNC: 32.1 GM/DL
MCV RBC AUTO: 95.9 FL
MONOCYTES # BLD AUTO: 0.21 K/UL
MONOCYTES NFR BLD AUTO: 6.1 %
NEUTROPHILS # BLD AUTO: 1.79 K/UL
NEUTROPHILS NFR BLD AUTO: 51.7 %
PLATELET # BLD AUTO: 214 K/UL
RBC # BLD: 4.1 M/UL
RBC # FLD: 12.7 %
WBC # FLD AUTO: 3.46 K/UL

## 2019-08-21 ENCOUNTER — APPOINTMENT (OUTPATIENT)
Dept: CT IMAGING | Facility: CLINIC | Age: 24
End: 2019-08-21
Payer: COMMERCIAL

## 2019-08-21 ENCOUNTER — OUTPATIENT (OUTPATIENT)
Dept: OUTPATIENT SERVICES | Facility: HOSPITAL | Age: 24
LOS: 1 days | End: 2019-08-21
Payer: COMMERCIAL

## 2019-08-21 DIAGNOSIS — M75.100 UNSPECIFIED ROTATOR CUFF TEAR OR RUPTURE OF UNSPECIFIED SHOULDER, NOT SPECIFIED AS TRAUMATIC: Chronic | ICD-10-CM

## 2019-08-21 DIAGNOSIS — Z00.8 ENCOUNTER FOR OTHER GENERAL EXAMINATION: ICD-10-CM

## 2019-08-21 DIAGNOSIS — Z98.89 OTHER SPECIFIED POSTPROCEDURAL STATES: Chronic | ICD-10-CM

## 2019-08-21 PROCEDURE — 73700 CT LOWER EXTREMITY W/O DYE: CPT | Mod: 26,RT

## 2019-08-21 PROCEDURE — 76376 3D RENDER W/INTRP POSTPROCES: CPT

## 2019-08-21 PROCEDURE — 73700 CT LOWER EXTREMITY W/O DYE: CPT

## 2019-08-22 PROBLEM — S92.241G: Status: ACTIVE | Noted: 2019-02-11

## 2019-08-22 PROBLEM — S90.121A: Status: ACTIVE | Noted: 2019-07-31

## 2019-08-26 ENCOUNTER — APPOINTMENT (OUTPATIENT)
Dept: ORTHOPEDIC SURGERY | Facility: CLINIC | Age: 24
End: 2019-08-26
Payer: COMMERCIAL

## 2019-08-26 VITALS — RESPIRATION RATE: 16 BRPM | BODY MASS INDEX: 23.16 KG/M2 | WEIGHT: 139 LBS | HEIGHT: 65 IN

## 2019-08-26 PROCEDURE — 77002 NEEDLE LOCALIZATION BY XRAY: CPT

## 2019-08-26 PROCEDURE — 20600 DRAIN/INJ JOINT/BURSA W/O US: CPT | Mod: RT

## 2019-08-26 PROCEDURE — 99214 OFFICE O/P EST MOD 30 MIN: CPT | Mod: 25

## 2019-08-27 ENCOUNTER — APPOINTMENT (OUTPATIENT)
Dept: ORTHOPEDIC SURGERY | Facility: CLINIC | Age: 24
End: 2019-08-27
Payer: COMMERCIAL

## 2019-08-27 PROCEDURE — 29405 APPL SHORT LEG CAST: CPT | Mod: RT

## 2019-09-04 ENCOUNTER — APPOINTMENT (OUTPATIENT)
Dept: ORTHOPEDIC SURGERY | Facility: CLINIC | Age: 24
End: 2019-09-04
Payer: COMMERCIAL

## 2019-09-04 ENCOUNTER — APPOINTMENT (OUTPATIENT)
Dept: MRI IMAGING | Facility: CLINIC | Age: 24
End: 2019-09-04
Payer: COMMERCIAL

## 2019-09-04 ENCOUNTER — OUTPATIENT (OUTPATIENT)
Dept: OUTPATIENT SERVICES | Facility: HOSPITAL | Age: 24
LOS: 1 days | End: 2019-09-04
Payer: COMMERCIAL

## 2019-09-04 DIAGNOSIS — Z98.89 OTHER SPECIFIED POSTPROCEDURAL STATES: Chronic | ICD-10-CM

## 2019-09-04 DIAGNOSIS — M75.100 UNSPECIFIED ROTATOR CUFF TEAR OR RUPTURE OF UNSPECIFIED SHOULDER, NOT SPECIFIED AS TRAUMATIC: Chronic | ICD-10-CM

## 2019-09-04 DIAGNOSIS — M25.374 OTHER INSTABILITY, RIGHT FOOT: ICD-10-CM

## 2019-09-04 DIAGNOSIS — M19.071 PRIMARY OSTEOARTHRITIS, RIGHT ANKLE AND FOOT: ICD-10-CM

## 2019-09-04 PROCEDURE — 99213 OFFICE O/P EST LOW 20 MIN: CPT

## 2019-09-04 PROCEDURE — 73718 MRI LOWER EXTREMITY W/O DYE: CPT | Mod: 26,RT

## 2019-09-04 PROCEDURE — 73718 MRI LOWER EXTREMITY W/O DYE: CPT

## 2019-09-09 ENCOUNTER — APPOINTMENT (OUTPATIENT)
Dept: ORTHOPEDIC SURGERY | Facility: CLINIC | Age: 24
End: 2019-09-09

## 2019-09-12 NOTE — ED BEHAVIORAL HEALTH ASSESSMENT NOTE - FAMILY HISTORY OF MEDICAL ILLNESS
None known

## 2019-09-21 NOTE — PHYSICAL EXAM
[de-identified] : 7.31.2019 \par Weightbearing right ankle x-rays were obtained today in the office reviewed by myself showing no fracture subluxations or dislocations.  No evidence of osteochondral defect.  Ankle mortise intact\par \par Weightbearing right foot x-rays were obtained today in the office and reviewed by myself: Screws in excellent position about first tarsometatarsal arthrodesis.  It appears to be well fused.  No evidence of Lisfranc interval widening.  Medial border of the second ray in line with medial border of the second cuneiform.\par Swelling of the second toe noted [de-identified] : General: The patient is awake and alert, demonstrates appropriate mood and affect, exhibits normal breathing and is in no acute distress. \par Psych: The patient is appropriately dressed and groomed, maintains good eye contact. Alert and oriented x 3. Normal attention/concentration. \par Skin. The patient has no chronic skin lesions, rashes or ulcers. There is no induration or erythema of uninvolved extremities. For skin exam of involved extremity refer to detailed musculoskeletal/extremity exam \par Respiratory: The patient is in no apparent respiratory distress. They are taking full deep breaths with normal excursion, without use of accessory muscles or evidence of audible wheezes or stridor without the use of a stethoscope \par Neurological: 5/5 motor strength and sensation intact throughout uninvolved upper and lower extremities. Refer to detailed musculoskeletal exam regarding involved extremity \par \par Right Foot \par Band-Aid over the second toe with swelling\par Dressing removed showing removal of lesser toenail with macerated edges and no evidence of fluctuance or purulence\par No drainage\par Exquisitely tender to palpation second toe\par No evidence of crepitus or false motion\par Decreased pain about the midfoot compared to preoperatively\par No evidence of instability of the right midfoot. \par Moderate tenderness to palpation about the metatarsal heads of the first second and third metatarsals.\par Mild swelling about the right foot\par Negative piano key testing\par No instability about the midfoot or forefoot\par Mildly decreased sensation of the dorsum of the hallux \par appearance: Skin intact. No skin breakdown. No lesions. No erythema. \par Vascular: Warm well-perfused foot. Palpable 2+ dorsalis pedis and posterior tibialis artery. + Brisk capillary refill to all 5 digits. \par Neuro: sensation intact to light touch in saphenous, sural, superficial peroneal, deep peroneal, and tibial nerve distributions \par Motor: + Fire extensor hallucis longus, flexor hallucis longus, tibilias anterior, gastrocsoleus. 5/5 strength in all myotomes \par Lymph: No evidence of venous stasis ulcers. No lymphedema. No pitting edema\par

## 2019-09-21 NOTE — PROCEDURE
[FreeTextEntry1] : Indication: R. Foot pain swelling concern for tendon irritation\par Procedure: Diagnostic ultrasound of the right foot \par \par An extensive discussion regarding all risks, benefits and alternatives to the proposed procedure, ultrasound-guided evaluation of the patient’s right foot  overlying []. The patient expressed understanding and all questions were answered. The patient is elected to proceed. Ultrasound evaluation of the patient’s right foot showed:\par No evidence of flexor hallucis longus tendon injury.  No evidence of excessive screw over penetration about the first tarsometatarsal arthrodesis.

## 2019-09-21 NOTE — HISTORY OF PRESENT ILLNESS
[FreeTextEntry1] : \par Pt is a 23 y.o. 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 Pt presents WB in sandals. The patient is here for a follow up after hitting her foot on July 31.2019. She is also complaining of midfoot pain. \par

## 2019-09-21 NOTE — DISCUSSION/SUMMARY
[Medication Risks Reviewed] : Medication risks reviewed [de-identified] : 7.31.2019 23-year-old female a little over 3 months out from her right first tarsometatarsal arthrodesis and repair of her medial cuneiform nonunion.  The patient has been relatively noncompliant with gentle progression of weightbearing due to life circumstances.  We had an extensive discussion today about ramping up her activity after being nonweightbearing.  She is a very active individual and has been going through pain for significant amount of time prior to operation and is anxious to return to her activity.  She did have a another trauma over the weekend in which she injured her right second toe causing new onset pain about her forefoot.  She does complain of altered sensation concerning for underlying nerve involvement.  I am also concerned about a potential CRPS type picture.\par \par 1. Follow up in 4 weeks. No new images needed at follow up. \par 2. WBAT in CAM boot. Transition out of CAM boot \par 3. C/w Physical therapy \par 4. Ice and elevate \par 4. C/w gabapentin \par 5. C/w bone stim \par 6. C/w Mderma, compression socks, gel heel cups \par 7. D/c tramadol \par 8. Referral to neurology, Dr. Austin, for neuritis \par 9. Referral to internal medicine, Dr. Anderson, to establish care and evaluate H. pylori.

## 2019-09-21 NOTE — DISCUSSION/SUMMARY
[Medication Risks Reviewed] : Medication risks reviewed [de-identified] :  08.16.2019 \par 24 y/o F returns almost 4 months s/p right calcaneal autograft harvest, ORIF of right medial cuneiform nonunion with bone grafting from right calcaneal autograft, right first tarsometatarsal joint arthrodesis. The patient is complaining of swelling and pain of her right midfoot. I had a lengthy discussion with the patient about being too active. I reiterated to her that standing for long periods of time and doing too much activity will increase her pain and swelling. I informed her that there is a chance of a nonunion and I explained that we will order a CT w/o contrast of her right foot.  I am significantly concerned that the patient has elements of underlying neurologic complaints contributing to her pain profile about her foot.  In addition the patient continues to be unable to transition appropriately back to appropriate shoewear.  Each visit she comes in and tells me that she had to run and do something and I explained that she hurts more because she is over burdening her foot.  I am concerned that the patient does not fully comprehend how her recovery should be going and what restrictions I am giving her.\par \par 1. Follow up in 2-3 weeks after CT w/o contrast of right foot to re-evaluate for nonunion. Pt was given prescription to get her CT done at an outside facility. Pt was instructed to bring the disc with images and report on it. No other images needed at follow up. \par 2. WBAT in supportive shoewear with powerstep pinancle orthotic. \par 3. C/w Gabapentin \par 4. Ice and elevate \par 5. C/w bone stim \par 6. C/w physical therapy \par 7. C/w Mederma, compression socks and gel heel cups \par *** Pt is to schedule appointment with Dr. Austin for neuritis of right foot.

## 2019-09-21 NOTE — PHYSICAL EXAM
[de-identified] : General: The patient is awake and alert, demonstrates appropriate mood and affect, exhibits normal breathing and is in no acute distress. \par Psych: The patient is appropriately dressed and groomed, maintains good eye contact. Alert and oriented x 3. Normal attention/concentration, fund of knowledge and recall. Normal speech rate and rhythm. No hallucinations, suicidal or homicidal ideations. Demonstrates expected level of insight and judgment regarding health. \par Skin: The patient has no chronic skin lesions, rashes, or ulcers. There is no induration or erythema of uninvolved extremities. For skin exam of involved extremity refer to detailed musculoskeletal/extremity exam. \par Respiratory: The patient is in no apparent respiratory distress. They are taking full deep breaths with normal excursion, without use of accessory muscles or evidence of audible wheezes or stridor without the use of a stethoscope \par Neurological: 5/5 motor strength and sensation intact throughout uninvolved upper and lower extremities. Refer to detailed musculoskeletal exam regarding involved extremity \par Neck: full range of motion with flexion, extension, rotation, and side bending, no palpable crepitus, normal alignment and lordosis, symmetric appearance, midline trachea, no thyroid hypertrophy or nodules \par \par Avoidance gait with mainly heel weightbearing \par \par right foot\par No bony tenderness about the Lisfranc interval or first or second tarsometatarsal bases dorsally or medially\par Mild to Moderate tenderness to palpation with deep palpation of the plantar aspect of the first tarsometatarsal joint\par No evidence of incarceration of the flexor hallucis longus or mechanical symptoms with range of motion of the lesser digits\par Minimal to no pain about the midfoot\par Moderate pain about the second and third metatarsal heads plantarly\par No evidence of instability about the forefoot\par Negative Tinel's about the midfoot\par Negative Tinel's about the retromalleolar groove\par Appearance: Skin intact. No skin breakdown. No lesions. No erythema. \par Vascular: Warm well-perfused foot. Palpable 2+ dorsalis pedis and posterior tibialis artery. + Brisk capillary refill to all 5 digits. \par Neuro: sensation intact to light touch in saphenous, sural, superficial peroneal, deep peroneal, and tibial nerve distributions \par Motor: + Fire extensor hallucis longus, flexor hallucis longus, tibilias anterior, gastrocsoleus. 5/5 strength in all myotomes \par Lymph: No evidence of venous stasis ulcers. No lymphedema. No pitting edema\par  [de-identified] : 7.31.2019 \par Weightbearing right ankle x-rays were obtained today in the office reviewed by myself showing no fracture subluxations or dislocations. No evidence of osteochondral defect. Ankle mortise intact\par \par Weightbearing right foot x-rays were obtained today in the office and reviewed by myself: Screws in excellent position about first tarsometatarsal arthrodesis. It appears to be well fused. No evidence of Lisfranc interval widening. Medial border of the second ray in line with medial border of the second cuneiform.\par Swelling of the second toe noted. \par \par

## 2019-09-21 NOTE — HISTORY OF PRESENT ILLNESS
[FreeTextEntry1] : DOS:04.25.2019\par \par \par Pt is a 23 y.o. female 13 weeks and 6 days s/p right calcaneal autograft harvest, ORIF of right medial cuneiform nonunion with bone grafting from right calcaneal autograft, right first tarsometatarsal joint arthrodesis. Pt presents WB in CAM boot. The patient reports that she hit her foot over the weekend. She states she kicked a wall. She states that she went to urgent care over the weekend and they had to remove her toenail of her second toe on her right foot. THe patient is complaining of toe pain and is concerned if it did damage to her arthrodesis. She reports that urgent care gave her Sulfedizine to put on the area where they removed her toenail.

## 2019-09-21 NOTE — REVIEW OF SYSTEMS
[FreeTextEntry9] : Review of Systems no feelings of weakness  \par All other review of systems are negative except as noted. \par Constitutional: no chills, not feeling tired and no fever. \par Eyes: no discharge, no pain and no redness. \par ENT: no nasal discharge, no nosebleeds and no sore throat. \par Cardiovascular: no chest pain, no palpitations and the heart rate was not fast. \par Respiratory: no shortness of breath, no cough and no wheezing. \par Gastrointestinal: no abdominal pain, no diarrhea, no vomiting and no melena. \par Genitourinary: no pelvic pain, no dysuria, no abnormal urethral discharge and no frequency. \par Integumentary: no skin lesions and no change in a mole. \par Neurological: no dizziness, no fainting and no convulsions. \par Endocrine: no deepening of the voice and no hot flashes. \par Hematologic/Lymphatic: does not bleed easily, no swollen glands and no cervical lymphadenopathy. \par Musculoskeletal: [as per HPI, otherwise denies additional joint pain, effusions, stiffness.]\par

## 2019-09-21 NOTE — REVIEW OF SYSTEMS
[FreeTextEntry9] : Review of Systems no feelings of weakness  \par All other review of systems are negative except as noted. \par Constitutional: no chills, not feeling tired and no fever. \par Eyes: no discharge, no pain and no redness. \par ENT: no nasal discharge, no nosebleeds and no sore throat. \par Cardiovascular: no chest pain, no palpitations and the heart rate was not fast. \par Respiratory: no shortness of breath, no cough and no wheezing. \par Gastrointestinal: no abdominal pain, no diarrhea, no vomiting and no melena. \par Genitourinary: no pelvic pain, no dysuria, no abnormal urethral discharge and no frequency. \par Integumentary: no skin lesions and no change in a mole. \par Neurological: no dizziness, no fainting and no convulsions. \par Endocrine: no deepening of the voice and no hot flashes. \par Hematologic/Lymphatic: does not bleed easily, no swollen glands and no cervical lymphadenopathy. \par Musculoskeletal: [as per HPI, otherwise denies additional joint pain, effusions, stiffness.]

## 2019-10-16 ENCOUNTER — APPOINTMENT (OUTPATIENT)
Dept: ORTHOPEDIC SURGERY | Facility: CLINIC | Age: 24
End: 2019-10-16
Payer: COMMERCIAL

## 2019-10-16 VITALS — RESPIRATION RATE: 16 BRPM | BODY MASS INDEX: 23.16 KG/M2 | HEIGHT: 65 IN | WEIGHT: 139 LBS

## 2019-10-16 DIAGNOSIS — M84.374A STRESS FRACTURE, RIGHT FOOT, INITIAL ENCOUNTER FOR FRACTURE: ICD-10-CM

## 2019-10-16 DIAGNOSIS — L60.0 INGROWING NAIL: ICD-10-CM

## 2019-10-16 DIAGNOSIS — S99.921S UNSPECIFIED INJURY OF RIGHT FOOT, SEQUELA: ICD-10-CM

## 2019-10-16 PROCEDURE — 99214 OFFICE O/P EST MOD 30 MIN: CPT

## 2019-10-28 ENCOUNTER — TRANSCRIPTION ENCOUNTER (OUTPATIENT)
Age: 24
End: 2019-10-28

## 2019-10-29 ENCOUNTER — TRANSCRIPTION ENCOUNTER (OUTPATIENT)
Age: 24
End: 2019-10-29

## 2019-11-05 ENCOUNTER — EMERGENCY (EMERGENCY)
Facility: HOSPITAL | Age: 24
LOS: 1 days | Discharge: HOME | End: 2019-11-05
Attending: EMERGENCY MEDICINE | Admitting: EMERGENCY MEDICINE
Payer: COMMERCIAL

## 2019-11-05 VITALS
DIASTOLIC BLOOD PRESSURE: 81 MMHG | HEART RATE: 103 BPM | RESPIRATION RATE: 18 BRPM | SYSTOLIC BLOOD PRESSURE: 124 MMHG | TEMPERATURE: 98 F | OXYGEN SATURATION: 100 %

## 2019-11-05 DIAGNOSIS — Z98.89 OTHER SPECIFIED POSTPROCEDURAL STATES: Chronic | ICD-10-CM

## 2019-11-05 DIAGNOSIS — M75.100 UNSPECIFIED ROTATOR CUFF TEAR OR RUPTURE OF UNSPECIFIED SHOULDER, NOT SPECIFIED AS TRAUMATIC: Chronic | ICD-10-CM

## 2019-11-05 PROCEDURE — 99283 EMERGENCY DEPT VISIT LOW MDM: CPT

## 2019-11-05 RX ORDER — CYCLOBENZAPRINE HYDROCHLORIDE 10 MG/1
1 TABLET, FILM COATED ORAL
Qty: 21 | Refills: 0
Start: 2019-11-05 | End: 2019-11-11
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
## 2019-11-05 NOTE — ED PROVIDER NOTE - NS_ ATTENDINGSCRIBEDETAILS _ED_A_ED_FT
The scribe's documentation has been prepared under my direction and personally reviewed by me in its entirety. I confirm that the note above accurately reflects all work, treatment, procedures, and medical decision making performed by Noman Cordova MD
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
## 2019-11-05 NOTE — ED PROVIDER NOTE - NEUROLOGICAL, MLM
Alert and oriented, no focal deficits, no motor or sensory deficits. Strength 5/5 and NVI. Patellar reflexes 2+.

## 2019-11-15 NOTE — ED CDU PROVIDER DISPOSITION NOTE - NS ED MD EM DAY 1
Observation Stay One Calendar Day Oculoplastic Surgeon Procedure Text (B): After obtaining clear surgical margins the patient was sent to oculoplastics for surgical repair.  The patient understands they will receive post-surgical care and follow-up from the referring physician's office.

## 2019-11-18 PROBLEM — K29.50 UNSPECIFIED CHRONIC GASTRITIS WITHOUT BLEEDING: Chronic | Status: ACTIVE | Noted: 2019-11-05

## 2019-11-19 ENCOUNTER — TRANSCRIPTION ENCOUNTER (OUTPATIENT)
Age: 24
End: 2019-11-19

## 2019-11-22 ENCOUNTER — APPOINTMENT (OUTPATIENT)
Dept: NEUROLOGY | Facility: CLINIC | Age: 24
End: 2019-11-22
Payer: COMMERCIAL

## 2019-11-22 ENCOUNTER — TRANSCRIPTION ENCOUNTER (OUTPATIENT)
Age: 24
End: 2019-11-22

## 2019-11-22 VITALS
TEMPERATURE: 98 F | BODY MASS INDEX: 24.66 KG/M2 | OXYGEN SATURATION: 98 % | HEART RATE: 100 BPM | HEIGHT: 65 IN | WEIGHT: 148 LBS | DIASTOLIC BLOOD PRESSURE: 73 MMHG | SYSTOLIC BLOOD PRESSURE: 112 MMHG

## 2019-11-22 PROCEDURE — 99244 OFF/OP CNSLTJ NEW/EST MOD 40: CPT

## 2019-11-22 NOTE — ASSESSMENT
[FreeTextEntry1] : Sensory symptoms may be due to intermittent compression / irritation of superficial fibular (peroneal) nerve at the talotibial joint / inferior extensor retinaculum \par Recent NCS show normal and symmetric responses indicating no damage to this nerve\par Given history, unlikely to be from radiculopathy; she is getting MRI L spine (along with C and T spine) ordered by her chiropractor, she will send me results when they are available\par Continue gabapentin as needed\par Symptoms have improved since surgery and should continue to do so \par There is no swelling, erythema, or allodynia to suggest CRPS\par Return as needed

## 2019-11-22 NOTE — PHYSICAL EXAM
[FreeTextEntry1] : Gen: appears well, well-nourished, no acute distress\par \par MS: awake, alert, oriented, speech fluent, comprehension intact, good fund of knowledge, recent and remote memory intact, attention intact\par \par CN: PERRL, EOMI, visual fields full, facial strength and sensation intact and symmetric, palate elevation symmetric, tongue midline, no tongue atrophy or fasciculations\par \par Motor: normal bulk and tone, 5/5 strength throughout, no abnormal movements\par \par Sensory: light touch and pinprick intact and symmetric in LE b/l; vibration intact at big toes b/l\par \par Reflexes: 2+ symmetric throughout\par \par Coordination: no dysmetria on finger to nose, Romberg negative\par \par Gait: normal, can tandem and walk on heels and toes without difficulty\par \par MSK: tenderness of right anterior talotibial area; no tinels' sign at medial malleolus\par \par CV: 2+ DP pulses, no edema\par \par Ophtho- fundi not visualized

## 2019-11-22 NOTE — CONSULT LETTER
[Dear  ___] : Dear  [unfilled], [Consult Letter:] : I had the pleasure of evaluating your patient, [unfilled]. [Please see my note below.] : Please see my note below. [Consult Closing:] : Thank you very much for allowing me to participate in the care of this patient.  If you have any questions, please do not hesitate to contact me. [Sincerely,] : Sincerely, [FreeTextEntry3] : Tae Austin M.D.\par Neurology, Electromyography and Neuromuscular Medicine\par [facility]\par \par  of Neurology\par [facility] / [employer]

## 2019-11-22 NOTE — HISTORY OF PRESENT ILLNESS
[FreeTextEntry1] : CC: right foot paresthesias\par \par HPI: 23 yo W referred by Dr. Zhang for right foot paresthesias. \par \par Location: right foot, top > bottom \par Quality: tingling \par Severity: moderate\par Duration: 1 year\par Timing: when walking \par Context: had surgery on right foot about 6 months ago \par Modifying Factors: worse with walking, better with resting; gabapentin helps, but she takes as needed and not regularly \par Associated signs and symptoms: pain in anterior upper ankle \par \par Other problems: Back pain, no radiation into right leg\par History of sciatica on left \par \par Data reviewed:\par Imaging (reports): MRI right foot - 2nd and 3rd metatarsal stress reaction vs. contusion; no evidence of tenosynovitis \par Imaging (independently reviewed): MRI L spine 2012 normal \par Other: NCS/EMG normal \par \par ROS: 13 pt review of systems performed and reviewed with patient (General, Eyes, Ears, Cardiovascular, Respiratory, Gastrointestinal, Genitourinary, Musculoskeletal, Skin, Endocrine, Hematologic, Psychiatric, Neurologic)\par Past medical history, surgical history, social history, and family history reviewed with patient\par See scanned document for details

## 2019-11-25 ENCOUNTER — TRANSCRIPTION ENCOUNTER (OUTPATIENT)
Age: 24
End: 2019-11-25

## 2019-12-04 ENCOUNTER — APPOINTMENT (OUTPATIENT)
Dept: ORTHOPEDIC SURGERY | Facility: CLINIC | Age: 24
End: 2019-12-04
Payer: COMMERCIAL

## 2019-12-04 DIAGNOSIS — S99.921A UNSPECIFIED INJURY OF RIGHT FOOT, INITIAL ENCOUNTER: ICD-10-CM

## 2019-12-04 DIAGNOSIS — M79.671 PAIN IN RIGHT FOOT: ICD-10-CM

## 2019-12-04 DIAGNOSIS — G57.91 UNSPECIFIED MONONEUROPATHY OF RIGHT LOWER LIMB: ICD-10-CM

## 2019-12-04 DIAGNOSIS — M25.80 OTHER SPECIFIED JOINT DISORDERS, UNSPECIFIED JOINT: ICD-10-CM

## 2019-12-04 DIAGNOSIS — M25.374 OTHER INSTABILITY, RIGHT FOOT: ICD-10-CM

## 2019-12-04 PROCEDURE — 99213 OFFICE O/P EST LOW 20 MIN: CPT

## 2020-01-27 ENCOUNTER — APPOINTMENT (OUTPATIENT)
Dept: PHYSICAL MEDICINE AND REHAB | Facility: CLINIC | Age: 25
End: 2020-01-27

## 2020-01-27 ENCOUNTER — APPOINTMENT (OUTPATIENT)
Dept: NEUROLOGY | Facility: CLINIC | Age: 25
End: 2020-01-27
Payer: COMMERCIAL

## 2020-01-27 VITALS
TEMPERATURE: 98.3 F | DIASTOLIC BLOOD PRESSURE: 79 MMHG | BODY MASS INDEX: 22.82 KG/M2 | OXYGEN SATURATION: 96 % | WEIGHT: 137 LBS | HEIGHT: 65 IN | SYSTOLIC BLOOD PRESSURE: 122 MMHG | HEART RATE: 66 BPM

## 2020-01-27 DIAGNOSIS — R51 HEADACHE: ICD-10-CM

## 2020-01-27 DIAGNOSIS — M54.5 LOW BACK PAIN: ICD-10-CM

## 2020-01-27 DIAGNOSIS — M54.2 CERVICALGIA: ICD-10-CM

## 2020-01-27 DIAGNOSIS — M77.41 METATARSALGIA, RIGHT FOOT: ICD-10-CM

## 2020-01-27 DIAGNOSIS — R20.0 ANESTHESIA OF SKIN: ICD-10-CM

## 2020-01-27 PROCEDURE — 99214 OFFICE O/P EST MOD 30 MIN: CPT

## 2020-01-27 NOTE — HISTORY OF PRESENT ILLNESS
[FreeTextEntry1] : Pt returns c/o back pain, neck pain, "sciatica", "migraines", numbness in neck when she wears a headband too tight\par Still has pain in right dorsum of foot, directly overlying screws from surgery \par Her chriopracter ordered MRIs of the C, T and L spine which were reviewed - \par straightening of cervical spine due to neck muscle spasm, few small disc bulges, no cord or nerve root impingement\par lumbar spine appears normal to me although report states few mild disc bulges, regardless no nerve root impingement

## 2020-01-27 NOTE — ASSESSMENT
[FreeTextEntry1] : I explained to the patient that there is no evidence of spinal cord or nerve root compression on spinal imaging, or any significant disc herniation. The patient's neck and back pain is likely musculoskeletal and the treatment is PT and NSAIDs. Her headaches are consistent with tension headache which may be from neck muscle spasm. \par She states she has been doing PT since November with no improvement, and cannot take NSAIDs due to gastritis. I recommended that she follow up with the pain management physician she has been seeing (has an appointment later this week) for further treatment. \par \par Right foot pain - today states it is directly overlying the incision / screws. There is no physical evidence of CRPS. I recommended she f/u with Dr. Zhang regarding this.

## 2020-02-03 ENCOUNTER — TRANSCRIPTION ENCOUNTER (OUTPATIENT)
Age: 25
End: 2020-02-03

## 2020-02-04 ENCOUNTER — APPOINTMENT (OUTPATIENT)
Dept: SURGICAL ONCOLOGY | Facility: CLINIC | Age: 25
End: 2020-02-04
Payer: COMMERCIAL

## 2020-02-04 VITALS
SYSTOLIC BLOOD PRESSURE: 122 MMHG | HEIGHT: 65 IN | BODY MASS INDEX: 22.82 KG/M2 | HEART RATE: 122 BPM | WEIGHT: 137 LBS | OXYGEN SATURATION: 96 % | RESPIRATION RATE: 20 BRPM | DIASTOLIC BLOOD PRESSURE: 76 MMHG

## 2020-02-04 DIAGNOSIS — N63.10 UNSPECIFIED LUMP IN THE RIGHT BREAST, UNSPECIFIED QUADRANT: ICD-10-CM

## 2020-02-04 PROCEDURE — 99214 OFFICE O/P EST MOD 30 MIN: CPT

## 2020-02-05 ENCOUNTER — FORM ENCOUNTER (OUTPATIENT)
Age: 25
End: 2020-02-05

## 2020-02-05 NOTE — PHYSICAL EXAM
[Normal] : supple, no neck mass and thyroid not enlarged [Normal Neck Lymph Nodes] : normal neck lymph nodes  [Normal Axillary Lymph Nodes] : normal axillary lymph nodes [Normal Supraclavicular Lymph Nodes] : normal supraclavicular lymph nodes [Normal] : oriented to person, place and time, with appropriate affect [de-identified] : fibrocystic bilaterally; small, mobile nodules right upper outer breast (10-11:00 axis);   Area of fibrocystic density in the left 2:00

## 2020-02-05 NOTE — HISTORY OF PRESENT ILLNESS
[de-identified] : 24 year-old female presents for a follow up visit.\par Initially consulted 2018 for a palpable right breast mass \par The ultrasound of her breast 2018 showed a benign right breast nodule at the 10:00 position corresponding to the palpable lump, otherwise negative bilateral breast ultrasound.  BIRADS 3 \par Hx of right breast biopsy (as per the patient benign)\par \par Menarche 10\par  - age at first preg 16\par Family hx of breast cancer - maternal grandmother, maternal aunt, cousin  (pt.s mother is BRCA negative)\par \par INTERVAL HISTORY:\par 2020- Today she is with c/o palpable right upper outer breast masses and bilateral breast pain.  Denies palpable left breast masses or bilateral nipple discharge.  States she had a f/u breast ultrasound last year at [facility] however did not bring her results.  States she was recently diagnosed with endometriosis and was started on oral contraceptives.

## 2020-02-05 NOTE — ASSESSMENT
[FreeTextEntry1] : 24-year-old woman with a history of a right breast mass, likely benign-fibroadenoma versus fibrocystic disease.  \par \par 2/4/2020- Today she is with c/o palpable right upper outer breast masses and bilateral breast pain.  Denies palpable left breast masses or bilateral nipple discharge.  States she had a f/u breast ultrasound last year at [facility] however did not bring her results.  States she was recently diagnosed with endometriosis and was started on oral contraceptives.  On exam, she is fibrocystic bilaterally; small, mobile nodules right upper outer breast (10-11:00 axis);   Area of fibrocystic density in the left 2:00. \par \par Plan:\par 1) Bilateral breast ultrasound now\par 2) Patient will call EFREN Kat, after completing her breast ultrasound \par 3) Mammo at 39

## 2020-02-06 ENCOUNTER — OUTPATIENT (OUTPATIENT)
Dept: OUTPATIENT SERVICES | Facility: HOSPITAL | Age: 25
LOS: 1 days | End: 2020-02-06
Payer: COMMERCIAL

## 2020-02-06 ENCOUNTER — APPOINTMENT (OUTPATIENT)
Dept: ULTRASOUND IMAGING | Facility: IMAGING CENTER | Age: 25
End: 2020-02-06
Payer: COMMERCIAL

## 2020-02-06 DIAGNOSIS — N63.10 UNSPECIFIED LUMP IN THE RIGHT BREAST, UNSPECIFIED QUADRANT: ICD-10-CM

## 2020-02-06 DIAGNOSIS — Z98.89 OTHER SPECIFIED POSTPROCEDURAL STATES: Chronic | ICD-10-CM

## 2020-02-06 DIAGNOSIS — M75.100 UNSPECIFIED ROTATOR CUFF TEAR OR RUPTURE OF UNSPECIFIED SHOULDER, NOT SPECIFIED AS TRAUMATIC: Chronic | ICD-10-CM

## 2020-02-06 PROCEDURE — 76641 ULTRASOUND BREAST COMPLETE: CPT | Mod: 26,50

## 2020-02-06 PROCEDURE — 76641 ULTRASOUND BREAST COMPLETE: CPT

## 2020-09-13 ENCOUNTER — TRANSCRIPTION ENCOUNTER (OUTPATIENT)
Age: 25
End: 2020-09-13

## 2020-09-13 ENCOUNTER — INPATIENT (INPATIENT)
Facility: HOSPITAL | Age: 25
LOS: 0 days | Discharge: HOME | End: 2020-09-14
Attending: OBSTETRICS & GYNECOLOGY | Admitting: OBSTETRICS & GYNECOLOGY
Payer: COMMERCIAL

## 2020-09-13 VITALS
HEIGHT: 65 IN | HEART RATE: 116 BPM | TEMPERATURE: 98 F | OXYGEN SATURATION: 100 % | SYSTOLIC BLOOD PRESSURE: 131 MMHG | WEIGHT: 134.04 LBS | DIASTOLIC BLOOD PRESSURE: 79 MMHG | RESPIRATION RATE: 17 BRPM

## 2020-09-13 DIAGNOSIS — M75.100 UNSPECIFIED ROTATOR CUFF TEAR OR RUPTURE OF UNSPECIFIED SHOULDER, NOT SPECIFIED AS TRAUMATIC: Chronic | ICD-10-CM

## 2020-09-13 DIAGNOSIS — Z98.89 OTHER SPECIFIED POSTPROCEDURAL STATES: Chronic | ICD-10-CM

## 2020-09-13 LAB
ALBUMIN SERPL ELPH-MCNC: 4.7 G/DL — SIGNIFICANT CHANGE UP (ref 3.3–5)
ALP SERPL-CCNC: 47 U/L — SIGNIFICANT CHANGE UP (ref 40–120)
ALT FLD-CCNC: 9 U/L — SIGNIFICANT CHANGE UP (ref 4–33)
ANION GAP SERPL CALC-SCNC: 12 MMO/L — SIGNIFICANT CHANGE UP (ref 7–14)
APPEARANCE UR: CLEAR — SIGNIFICANT CHANGE UP
AST SERPL-CCNC: 14 U/L — SIGNIFICANT CHANGE UP (ref 4–32)
BACTERIA # UR AUTO: SIGNIFICANT CHANGE UP
BASOPHILS # BLD AUTO: 0.01 K/UL — SIGNIFICANT CHANGE UP (ref 0–0.2)
BASOPHILS NFR BLD AUTO: 0.4 % — SIGNIFICANT CHANGE UP (ref 0–2)
BILIRUB SERPL-MCNC: 0.7 MG/DL — SIGNIFICANT CHANGE UP (ref 0.2–1.2)
BILIRUB UR-MCNC: NEGATIVE — SIGNIFICANT CHANGE UP
BLOOD UR QL VISUAL: HIGH
BUN SERPL-MCNC: 6 MG/DL — LOW (ref 7–23)
CALCIUM SERPL-MCNC: 9.4 MG/DL — SIGNIFICANT CHANGE UP (ref 8.4–10.5)
CHLORIDE SERPL-SCNC: 103 MMOL/L — SIGNIFICANT CHANGE UP (ref 98–107)
CO2 SERPL-SCNC: 26 MMOL/L — SIGNIFICANT CHANGE UP (ref 22–31)
COLOR SPEC: SIGNIFICANT CHANGE UP
CREAT SERPL-MCNC: 0.55 MG/DL — SIGNIFICANT CHANGE UP (ref 0.5–1.3)
EOSINOPHIL # BLD AUTO: 0.02 K/UL — SIGNIFICANT CHANGE UP (ref 0–0.5)
EOSINOPHIL NFR BLD AUTO: 0.7 % — SIGNIFICANT CHANGE UP (ref 0–6)
GLUCOSE SERPL-MCNC: 100 MG/DL — HIGH (ref 70–99)
GLUCOSE UR-MCNC: NEGATIVE — SIGNIFICANT CHANGE UP
HCT VFR BLD CALC: 36.6 % — SIGNIFICANT CHANGE UP (ref 34.5–45)
HGB BLD-MCNC: 11.8 G/DL — SIGNIFICANT CHANGE UP (ref 11.5–15.5)
HYALINE CASTS # UR AUTO: SIGNIFICANT CHANGE UP
IMM GRANULOCYTES NFR BLD AUTO: 0.4 % — SIGNIFICANT CHANGE UP (ref 0–1.5)
KETONES UR-MCNC: NEGATIVE — SIGNIFICANT CHANGE UP
LEUKOCYTE ESTERASE UR-ACNC: SIGNIFICANT CHANGE UP
LYMPHOCYTES # BLD AUTO: 0.62 K/UL — LOW (ref 1–3.3)
LYMPHOCYTES # BLD AUTO: 21.9 % — SIGNIFICANT CHANGE UP (ref 13–44)
MCHC RBC-ENTMCNC: 30.4 PG — SIGNIFICANT CHANGE UP (ref 27–34)
MCHC RBC-ENTMCNC: 32.2 % — SIGNIFICANT CHANGE UP (ref 32–36)
MCV RBC AUTO: 94.3 FL — SIGNIFICANT CHANGE UP (ref 80–100)
MONOCYTES # BLD AUTO: 0.4 K/UL — SIGNIFICANT CHANGE UP (ref 0–0.9)
MONOCYTES NFR BLD AUTO: 14.1 % — HIGH (ref 2–14)
NEUTROPHILS # BLD AUTO: 1.77 K/UL — LOW (ref 1.8–7.4)
NEUTROPHILS NFR BLD AUTO: 62.5 % — SIGNIFICANT CHANGE UP (ref 43–77)
NITRITE UR-MCNC: NEGATIVE — SIGNIFICANT CHANGE UP
NRBC # FLD: 0 K/UL — SIGNIFICANT CHANGE UP (ref 0–0)
PH UR: 7.5 — SIGNIFICANT CHANGE UP (ref 5–8)
PLATELET # BLD AUTO: 208 K/UL — SIGNIFICANT CHANGE UP (ref 150–400)
PMV BLD: 11.1 FL — SIGNIFICANT CHANGE UP (ref 7–13)
POTASSIUM SERPL-MCNC: 3.3 MMOL/L — LOW (ref 3.5–5.3)
POTASSIUM SERPL-SCNC: 3.3 MMOL/L — LOW (ref 3.5–5.3)
PROT SERPL-MCNC: 8 G/DL — SIGNIFICANT CHANGE UP (ref 6–8.3)
PROT UR-MCNC: 50 — SIGNIFICANT CHANGE UP
RBC # BLD: 3.88 M/UL — SIGNIFICANT CHANGE UP (ref 3.8–5.2)
RBC # FLD: 12.7 % — SIGNIFICANT CHANGE UP (ref 10.3–14.5)
RBC CASTS # UR COMP ASSIST: HIGH (ref 0–?)
SODIUM SERPL-SCNC: 141 MMOL/L — SIGNIFICANT CHANGE UP (ref 135–145)
SP GR SPEC: 1.01 — SIGNIFICANT CHANGE UP (ref 1–1.04)
SQUAMOUS # UR AUTO: SIGNIFICANT CHANGE UP
UROBILINOGEN FLD QL: NORMAL — SIGNIFICANT CHANGE UP
WBC # BLD: 2.83 K/UL — LOW (ref 3.8–10.5)
WBC # FLD AUTO: 2.83 K/UL — LOW (ref 3.8–10.5)
WBC UR QL: HIGH (ref 0–?)

## 2020-09-13 PROCEDURE — 99285 EMERGENCY DEPT VISIT HI MDM: CPT

## 2020-09-13 RX ORDER — MORPHINE SULFATE 50 MG/1
4 CAPSULE, EXTENDED RELEASE ORAL ONCE
Refills: 0 | Status: DISCONTINUED | OUTPATIENT
Start: 2020-09-13 | End: 2020-09-13

## 2020-09-13 RX ORDER — SODIUM CHLORIDE 9 MG/ML
1000 INJECTION INTRAMUSCULAR; INTRAVENOUS; SUBCUTANEOUS ONCE
Refills: 0 | Status: COMPLETED | OUTPATIENT
Start: 2020-09-13 | End: 2020-09-13

## 2020-09-13 RX ADMIN — MORPHINE SULFATE 4 MILLIGRAM(S): 50 CAPSULE, EXTENDED RELEASE ORAL at 22:17

## 2020-09-13 RX ADMIN — MORPHINE SULFATE 4 MILLIGRAM(S): 50 CAPSULE, EXTENDED RELEASE ORAL at 23:00

## 2020-09-13 RX ADMIN — SODIUM CHLORIDE 1000 MILLILITER(S): 9 INJECTION INTRAMUSCULAR; INTRAVENOUS; SUBCUTANEOUS at 22:16

## 2020-09-13 NOTE — ED ADULT TRIAGE NOTE - CHIEF COMPLAINT QUOTE
Pt arrives to ED via EMS c/o RLQ abd pain radiating to lower back with nausea since Wednesday.  Pt reports pain during micturition.  Pt PMD thinks pt may have appendicitis.  Pt has hx of endometriosis and fibroids with scheduled removal in October.

## 2020-09-13 NOTE — ED PROVIDER NOTE - OBJECTIVE STATEMENT
24 year old female with history of gastritis and pelvic pain 2/2 endometriosis, adenomyosis, and fibroids p/w abdominal/flank pain x 4 days. Patient states she was at work when she had sudden onset of sharp 10+/10 RLQ pain shooting through the R flank, was unable to ambulate, took tylenol without relief. Endorses associated chills and nausea but denies fever, SOB, chest pain, vaginal discharge. Currently on menstrual cycle. Patient is scheduled to undergo myomectomy 10/2. 24 year old female with history of gastritis and pelvic pain 2/2 endometriosis, adenomyosis, and fibroids p/w abdominal/flank pain x 4 days. Patient states she was at work when she had sudden onset of sharp 10+/10 RLQ pain shooting through the R flank, was unable to ambulate, took tylenol without relief. Endorses associated chills and nausea but denies fever, SOB, chest pain, vaginal discharge. Pt states she is currently menstruating. Patient is scheduled to undergo myomectomy 10/2.

## 2020-09-13 NOTE — ED ADULT NURSE NOTE - OBJECTIVE STATEMENT
Pt. received to room 14, A&Ox4, ambulatory, c/o abdominal pain. Pt. states she has had lower quadrant pain radiating to back since Wednesday, getting progressively worse. PMD recommended she come to the ED. Patient appears uncomfortable. Abdomen tender on palpation. Denies vomiting/diarrhea. No HA/SOB/CP. 18 G IV placed to L AC, labs drawn and sent. MD at bedside. VS as noted. Awaiting further orders, will continue to monitor.

## 2020-09-13 NOTE — ED PROVIDER NOTE - CLINICAL SUMMARY MEDICAL DECISION MAKING FREE TEXT BOX
24 year old female with history of gastritis and pelvic pain 2/2 endometriosis, adenomyosis, and fibroids p/w RLQ and lower back pain with vaginal bleeding- pt initially thought this was her menstrual period, found to be pregnant here also with UTI, pending TVUS to eval for ectopic vs miscarriage.

## 2020-09-13 NOTE — ED PROVIDER NOTE - PHYSICAL EXAMINATION
Gen - In moderate distress but cooperative with exam and speaks full sentences; A+Ox3   HEENT - NCAT, EOMI, moist mucous membranes  Resp - CTAB,  symmetric breath sounds  CV -  RRR  Abd - soft, tender in suprapubic region/RLQ, no rebound or guarding,  Back - no midline, paraspinous, or CVA tenderness  MSK - 5/5 strength and FROM b/l UE and LE  Extrem - 3+ distal pulses b/L UE and LE  Skin - no rash or bruising, warm and well perfused  Neuro - no focal motor or sensation deficits,

## 2020-09-13 NOTE — ED PROVIDER NOTE - NS ED ROS FT
Gen: No fever, no weight loss, no fatigue  CV: No chest pain, no palpitations  HEENT: No sore throat, no hoarseness  Skin: No rash, no color changes  Resp: No SOB, no cough  Endo: No sensitivity to heat or cold, no appetite changes  GI: No constipation, no diarrhea, +nausea, no vomiting  Msk: +back pain, no LE swelling, no extremity pain  : +dysuria, no increased frequency  Neuro: No LOC, no weakness, no numbness

## 2020-09-13 NOTE — ED PROVIDER NOTE - ATTENDING CONTRIBUTION TO CARE
I performed a history and physical exam of the patient and discussed their management with the resident. I reviewed the resident's note and agree with the documented findings and plan of care. I have edited as appropriate. My medical decision making and observations are found above.  Pregnant with vaginal bleeding, pending labs and TVUS. Uncomfortable appearing, +R lower abd ttp.

## 2020-09-13 NOTE — ED PROVIDER NOTE - PROGRESS NOTE DETAILS
Domenic PGY-1: Patient re-evaluated. Pain improved with morphine. Notified patient of +upreg and serum hcg results. Discussed management plan for r/o ectopic vs miscarriage. Patient agreeable.

## 2020-09-14 ENCOUNTER — RESULT REVIEW (OUTPATIENT)
Age: 25
End: 2020-09-14

## 2020-09-14 VITALS
RESPIRATION RATE: 18 BRPM | OXYGEN SATURATION: 100 % | SYSTOLIC BLOOD PRESSURE: 116 MMHG | TEMPERATURE: 98 F | DIASTOLIC BLOOD PRESSURE: 56 MMHG | HEART RATE: 72 BPM

## 2020-09-14 DIAGNOSIS — R10.9 UNSPECIFIED ABDOMINAL PAIN: ICD-10-CM

## 2020-09-14 LAB
BLD GP AB SCN SERPL QL: NEGATIVE — SIGNIFICANT CHANGE UP
CULTURE RESULTS: SIGNIFICANT CHANGE UP
RH IG SCN BLD-IMP: POSITIVE — SIGNIFICANT CHANGE UP
SARS-COV-2 RNA SPEC QL NAA+PROBE: SIGNIFICANT CHANGE UP
SPECIMEN SOURCE: SIGNIFICANT CHANGE UP

## 2020-09-14 PROCEDURE — 88305 TISSUE EXAM BY PATHOLOGIST: CPT | Mod: 26

## 2020-09-14 PROCEDURE — 76830 TRANSVAGINAL US NON-OB: CPT | Mod: 26

## 2020-09-14 RX ORDER — MORPHINE SULFATE 50 MG/1
2 CAPSULE, EXTENDED RELEASE ORAL ONCE
Refills: 0 | Status: DISCONTINUED | OUTPATIENT
Start: 2020-09-14 | End: 2020-09-14

## 2020-09-14 RX ORDER — IPRATROPIUM/ALBUTEROL SULFATE 18-103MCG
3 AEROSOL WITH ADAPTER (GRAM) INHALATION ONCE
Refills: 0 | Status: COMPLETED | OUTPATIENT
Start: 2020-09-14 | End: 2020-09-14

## 2020-09-14 RX ORDER — ONDANSETRON 8 MG/1
4 TABLET, FILM COATED ORAL ONCE
Refills: 0 | Status: COMPLETED | OUTPATIENT
Start: 2020-09-14 | End: 2020-09-14

## 2020-09-14 RX ORDER — SODIUM CHLORIDE 9 MG/ML
1000 INJECTION, SOLUTION INTRAVENOUS
Refills: 0 | Status: DISCONTINUED | OUTPATIENT
Start: 2020-09-14 | End: 2020-09-14

## 2020-09-14 RX ORDER — OXYCODONE HYDROCHLORIDE 5 MG/1
1 TABLET ORAL
Qty: 6 | Refills: 0
Start: 2020-09-14

## 2020-09-14 RX ORDER — SODIUM CHLORIDE 9 MG/ML
500 INJECTION, SOLUTION INTRAVENOUS ONCE
Refills: 0 | Status: COMPLETED | OUTPATIENT
Start: 2020-09-14 | End: 2020-09-14

## 2020-09-14 RX ORDER — HYDROMORPHONE HYDROCHLORIDE 2 MG/ML
0.5 INJECTION INTRAMUSCULAR; INTRAVENOUS; SUBCUTANEOUS
Refills: 0 | Status: DISCONTINUED | OUTPATIENT
Start: 2020-09-14 | End: 2020-09-14

## 2020-09-14 RX ADMIN — HYDROMORPHONE HYDROCHLORIDE 0.5 MILLIGRAM(S): 2 INJECTION INTRAMUSCULAR; INTRAVENOUS; SUBCUTANEOUS at 07:15

## 2020-09-14 RX ADMIN — HYDROMORPHONE HYDROCHLORIDE 0.5 MILLIGRAM(S): 2 INJECTION INTRAMUSCULAR; INTRAVENOUS; SUBCUTANEOUS at 06:56

## 2020-09-14 RX ADMIN — ONDANSETRON 4 MILLIGRAM(S): 8 TABLET, FILM COATED ORAL at 07:30

## 2020-09-14 RX ADMIN — MORPHINE SULFATE 2 MILLIGRAM(S): 50 CAPSULE, EXTENDED RELEASE ORAL at 03:24

## 2020-09-14 RX ADMIN — Medication 3 MILLILITER(S): at 07:50

## 2020-09-14 RX ADMIN — SODIUM CHLORIDE 1000 MILLILITER(S): 9 INJECTION, SOLUTION INTRAVENOUS at 07:51

## 2020-09-14 RX ADMIN — HYDROMORPHONE HYDROCHLORIDE 0.5 MILLIGRAM(S): 2 INJECTION INTRAMUSCULAR; INTRAVENOUS; SUBCUTANEOUS at 06:16

## 2020-09-14 RX ADMIN — SODIUM CHLORIDE 125 MILLILITER(S): 9 INJECTION, SOLUTION INTRAVENOUS at 03:24

## 2020-09-14 RX ADMIN — HYDROMORPHONE HYDROCHLORIDE 0.5 MILLIGRAM(S): 2 INJECTION INTRAMUSCULAR; INTRAVENOUS; SUBCUTANEOUS at 07:30

## 2020-09-14 RX ADMIN — HYDROMORPHONE HYDROCHLORIDE 0.5 MILLIGRAM(S): 2 INJECTION INTRAMUSCULAR; INTRAVENOUS; SUBCUTANEOUS at 06:32

## 2020-09-14 RX ADMIN — Medication 200 MILLIGRAM(S): at 09:14

## 2020-09-14 NOTE — ASU DISCHARGE PLAN (ADULT/PEDIATRIC) - CALL YOUR DOCTOR IF YOU HAVE ANY OF THE FOLLOWING:
Nausea and vomiting that does not stop/Bleeding that does not stop/Swelling that gets worse/Pain not relieved by Medications/Unable to urinate/Fever greater than (need to indicate Fahrenheit or Celsius) Pain not relieved by Medications/Bleeding that does not stop/Swelling that gets worse/Nausea and vomiting that does not stop/Wound/Surgical Site with redness, or foul smelling discharge or pus/Unable to urinate/Fever greater than (need to indicate Fahrenheit or Celsius) Nausea and vomiting that does not stop/Bleeding that does not stop/Pain not relieved by Medications/Wound/Surgical Site with redness, or foul smelling discharge or pus/Unable to urinate/Swelling that gets worse/Inability to tolerate liquids or foods/Fever greater than (need to indicate Fahrenheit or Celsius)

## 2020-09-14 NOTE — H&P ADULT - NSICDXPASTMEDICALHX_GEN_ALL_CORE_FT
PAST MEDICAL HISTORY:  Asthma     Chronic gastritis     Endometriosis     Ovarian cyst     Sciatica

## 2020-09-14 NOTE — ASU DISCHARGE PLAN (ADULT/PEDIATRIC) - ASU DC SPECIAL INSTRUCTIONSFT
Regular diet as tolerated, regular activity as tolerated, no heavy lifting for first two weeks.  Nothing per vagina: no intercourse, tampons or douching. No submerging. Call your provider if you experience fevers, chills, worsening abdominal pain, inability to urinate or worsening vaginal bleeding.  Follow up with your provider in 2 weeks. Regular diet as tolerated, regular activity as tolerated, no heavy lifting for first two weeks.  Nothing per vagina: no intercourse, tampons or douching. No submerging. Call your provider if you experience fevers, chills, worsening abdominal pain, inability to urinate or worsening vaginal bleeding.  Follow up with your provider in 2 weeks.    You were given 1000mg IV Tylenol for pain management.  Please DO NOT take Tylenol, Vicodin or Percocet for the next 6 hours (until 11:30 am).  DO NOT EXCEED 3000MG OF TYLENOL OVER 24 HOURS. Regular diet as tolerated, regular activity as tolerated, no heavy lifting for first two weeks.  Nothing per vagina: no intercourse, tampons or douching. No submerging. Call your provider if you experience fevers, chills, worsening abdominal pain, inability to urinate or worsening vaginal bleeding.  Follow up with your provider in 2 weeks.    You were given 1000mg IV Tylenol for pain management.  Please DO NOT take Tylenol, Vicodin or Percocet for the next 6 hours (until 11:30 am).  DO NOT EXCEED 3000MG OF TYLENOL OVER 24 HOURS.  Take oxycodone for severe pain as needed.

## 2020-09-14 NOTE — CONSULT NOTE ADULT - ATTENDING COMMENTS
23y/o    c/o vaginal bleeding and abdominal pain found to have a b-HCG of 1350.  Exam concerning for tubal ectopic pregnancy - for diagnostic laparoscopy.

## 2020-09-14 NOTE — ASU DISCHARGE PLAN (ADULT/PEDIATRIC) - ACTIVITY LEVEL
No excercise/No heavy lifting/Nothing per rectum/No intercourse/Nothing per vagina/No douching/No tampons/No weight bearing

## 2020-09-14 NOTE — H&P ADULT - NSHPLABSRESULTS_GEN_ALL_CORE
LABS:                        11.8   2.83  )-----------( 208      ( 13 Sep 2020 22:10 )             36.6         141  |  103  |  6<L>  ----------------------------<  100<H>  3.3<L>   |  26  |  0.55    Ca    9.4      13 Sep 2020 22:10    TPro  8.0  /  Alb  4.7  /  TBili  0.7  /  DBili  x   /  AST  14  /  ALT  9   /  AlkPhos  47      I&O's Detail      Urinalysis Basic - ( 13 Sep 2020 22:10 )    Color: LIGHT BROWN / Appearance: CLEAR / S.007 / pH: 7.5  Gluc: NEGATIVE / Ketone: NEGATIVE  / Bili: NEGATIVE / Urobili: NORMAL   Blood: LARGE / Protein: 50 / Nitrite: NEGATIVE   Leuk Esterase: MODERATE / RBC: 11-25 / WBC 11-25   Sq Epi: OCC / Non Sq Epi: x / Bacteria: FEW        RADIOLOGY & ADDITIONAL STUDIES:  ul< from: US Transvaginal (20 @ 01:23) >  EXAM:  US TRANSVAGINAL        PROCEDURE DATE:  Sep 14 2020         INTERPRETATION:  CLINICAL INFORMATION: Right lower quadrant pain. Vaginal bleeding. History of adenomyosis and endometriosis. Pregnant. Beta-hCG 1350.    LMP: 2020.  Estimated gestational age by LMP: 5 weeks 2 days.    COMPARISON: Pelvic ultrasound 2019.    TECHNIQUE:  Endovaginal and transabdominal pelvic sonogram.    FINDINGS:    Limited exam secondary to overlying bowel gas.    Uterus: 7.5 x 4.8 x 4.5 cm. No intrauterine gestation sac. No fibroids identified.  Endometrium: 6 mm. Within normal limits.    Right ovary: 2.9 x 2.7 x 1.8 cm. Corpus luteal cyst measuring 1.3 x 1.4 x 1.2 cm.  Left ovary: 2.7 x 1.5 x 1.7 cm. Within normal limits.      Fluid: Small volume free fluid.    IMPRESSION:    No explanation for right lower quadrant pain on this pelvic ultrasound.    No massive hemoperitoneum or suspicious adnexal mass.  Pregnancy of unknown location. Follow-up with serial beta hCGs. Repeat ultrasound as clinically indicated.    PHILIPPE MCCAULEY M.D., RADIOLOGY RESIDENT  This document has been electronically signed.  YOMAIRA MARCANO M.D, ATTENDING RADIOLOGIST  This document has been electronically signed. Sep 14 2020 12:02AM    < end of copied text >

## 2020-09-14 NOTE — H&P ADULT - REASON FOR ADMISSION
Rule out ectopic pregnancy Cheek Interpolation Flap Text: A decision was made to reconstruct the defect utilizing an interpolation axial flap and a staged reconstruction.  A telfa template was made of the defect.  This telfa template was then used to outline the Cheek Interpolation flap.  The donor area for the pedicle flap was then injected with anesthesia.  The flap was excised through the skin and subcutaneous tissue down to the layer of the underlying musculature.  The interpolation flap was carefully excised within this deep plane to maintain its blood supply.  The edges of the donor site were undermined.   The donor site was closed in a primary fashion.  The pedicle was then rotated into position and sutured.  Once the tube was sutured into place, adequate blood supply was confirmed with blanching and refill.  The pedicle was then wrapped with xeroform gauze and dressed appropriately with a telfa and gauze bandage to ensure continued blood supply and protect the attached pedicle.

## 2020-09-14 NOTE — CONSULT NOTE ADULT - SUBJECTIVE AND OBJECTIVE BOX
ZAMZAM DELGADO  24y  Female [id number]    HPI: 25yo  LMP 9/10 PMHx endometriosis, adenomysosis, fibroids, gastritis and IBS, p/w abdominal pain since  and bleeding since 9/10 which patient thought was her normal menstrual cycle. Pt rates pain worse today rating it 10/10 requiring morphine 4mg in ED. At this time patient continues to have bleeding but no worse than previous menstruation. Point to RLQ for pain.        Name of GYN Physician:     POB:      Pgyn: Denies fibroids, cysts, endometriosis, STI's, Abnormal pap smears     Home meds:     Hospital Meds:   MEDICATIONS  (STANDING):    MEDICATIONS  (PRN):      Allergies    No Known Allergies    Intolerances        PAST MEDICAL & SURGICAL HISTORY:  Endometriosis    Chronic gastritis    Ovarian cyst    Asthma    Sciatica    Torn rotator cuff    History of   emergency, 1 year ago        FAMILY HISTORY:  No pertinent family history in first degree relatives        Social History:  Denies smoking use, drug use, alcohol use.   +occasional social alcohol use    Vital Signs Last 24 Hrs  T(C): 36.7 (13 Sep 2020 22:32), Max: 36.8 (13 Sep 2020 21:09)  T(F): 98 (13 Sep 2020 22:32), Max: 98.2 (13 Sep 2020 21:09)  HR: 74 (13 Sep 2020 22:32) (74 - 116)  BP: 121/71 (13 Sep 2020 22:32) (121/71 - 131/79)  BP(mean): --  RR: 18 (13 Sep 2020 22:32) (17 - 18)  SpO2: 100% (13 Sep 2020 22:32) (100% - 100%)    Physical Exam:   General: sitting comftorably in bed, NAD   HEENT: neck supple, full ROM  CV: RR S1S2 no m/r/g  Lungs: CTA b/l, good air flow b/l   Back: No CVA tenderness  Abd: Soft, non-tender, non-distended.  Bowel sounds present.    :  No bleeding on pad.    External labia wnl.  Bimanual exam with no cervical motion tenderness, uterus wnl, adnexa non palpable b/l.  Cervix closed vs. Cervix dilated    cm   Speculum Exam: No active bleeding from os.  Physiologic discharge.    Ext: non-tender b/l, no edema     LABS:                              11.8   2.83  )-----------( 208      ( 13 Sep 2020 22:10 )             36.6     09-13    141  |  103  |  6<L>  ----------------------------<  100<H>  3.3<L>   |  26  |  0.55    Ca    9.4      13 Sep 2020 22:10    TPro  8.0  /  Alb  4.7  /  TBili  0.7  /  DBili  x   /  AST  14  /  ALT  9   /  AlkPhos  47  09-13    I&O's Detail      Urinalysis Basic - ( 13 Sep 2020 22:10 )    Color: LIGHT BROWN / Appearance: CLEAR / S.007 / pH: 7.5  Gluc: NEGATIVE / Ketone: NEGATIVE  / Bili: NEGATIVE / Urobili: NORMAL   Blood: LARGE / Protein: 50 / Nitrite: NEGATIVE   Leuk Esterase: MODERATE / RBC: 11-25 / WBC 11-25   Sq Epi: OCC / Non Sq Epi: x / Bacteria: FEW        RADIOLOGY & ADDITIONAL STUDIES:      Marbin Oliva PGY2 ZAMZAM DELGADO  24y  Female [id number]    HPI: 23yo  LMP 9/10 PMHx endometriosis, adenomysosis, fibroids, gastritis and IBS, p/w abdominal pain since  and bleeding since 9/10 which patient thought was her normal menstrual cycle. Pt rates pain worse today rating it 10/10 requiring morphine 4mg in ED. At this time patient continues to have bleeding but no worse than previous menstruation. Point to RLQ for pain.    Name of GYN Physician: Dr.Joon Soares    POB:  2015 pLTCS for CATII, 2012 and  medical termination  Pgyn: endometriosis, adenomyosis, fibroids    Home meds:   Pantoprazole  Orlissa  Carafate    Allergies  No Known Allergies  Intolerances        PAST MEDICAL & SURGICAL HISTORY:  Endometriosis  Chronic gastritis  Asthma  Sciatica  Torn rotator cuff  History of   emergency, 1 year ago      FAMILY HISTORY:  No pertinent family history in first degree relatives        Social History:  Denies smoking use, drug use, alcohol use.    Vital Signs Last 24 Hrs  T(C): 36.7 (13 Sep 2020 22:32), Max: 36.8 (13 Sep 2020 21:09)  T(F): 98 (13 Sep 2020 22:32), Max: 98.2 (13 Sep 2020 21:09)  HR: 74 (13 Sep 2020 22:32) (74 - 116)  BP: 121/71 (13 Sep 2020 22:32) (121/71 - 131/79)  BP(mean): --  RR: 18 (13 Sep 2020 22:32) (17 - 18)  SpO2: 100% (13 Sep 2020 22:32) (100% - 100%)    Physical Exam:   General: sitting comftorably in bed, NAD   HEENT: neck supple, full ROM  CV: RR S1S2 no m/r/g  Lungs: CTA b/l, good air flow b/l   Back: No CVA tenderness  Abd: Soft, non-tender, non-distended.  Bowel sounds present.    :  POS bleeding on pad.    External labia wnl.  Bimanual exam with POS cervical motion tenderness, but uterus wnl, adnexa non palpable b/l.  Cervix closed  Ext: non-tender b/l, no edema     LABS:                        11.8   2.83  )-----------( 208      ( 13 Sep 2020 22:10 )             36.6     09-13    141  |  103  |  6<L>  ----------------------------<  100<H>  3.3<L>   |  26  |  0.55    Ca    9.4      13 Sep 2020 22:10    TPro  8.0  /  Alb  4.7  /  TBili  0.7  /  DBili  x   /  AST  14  /  ALT  9   /  AlkPhos  47      I&O's Detail      Urinalysis Basic - ( 13 Sep 2020 22:10 )    Color: LIGHT BROWN / Appearance: CLEAR / S.007 / pH: 7.5  Gluc: NEGATIVE / Ketone: NEGATIVE  / Bili: NEGATIVE / Urobili: NORMAL   Blood: LARGE / Protein: 50 / Nitrite: NEGATIVE   Leuk Esterase: MODERATE / RBC: 11-25 / WBC 11-25   Sq Epi: OCC / Non Sq Epi: x / Bacteria: FEW        RADIOLOGY & ADDITIONAL STUDIES:  ul< from: US Transvaginal (20 @ 01:23) >  EXAM:  US TRANSVAGINAL        PROCEDURE DATE:  Sep 14 2020         INTERPRETATION:  CLINICAL INFORMATION: Right lower quadrant pain. Vaginal bleeding. History of adenomyosis and endometriosis. Pregnant. Beta-hCG 1350.    LMP: 2020.  Estimated gestational age by LMP: 5 weeks 2 days.    COMPARISON: Pelvic ultrasound 2019.    TECHNIQUE:  Endovaginal and transabdominal pelvic sonogram.    FINDINGS:    Limited exam secondary to overlying bowel gas.    Uterus: 7.5 x 4.8 x 4.5 cm. No intrauterine gestation sac. No fibroids identified.  Endometrium: 6 mm. Within normal limits.    Right ovary: 2.9 x 2.7 x 1.8 cm. Corpus luteal cyst measuring 1.3 x 1.4 x 1.2 cm.  Left ovary: 2.7 x 1.5 x 1.7 cm. Within normal limits.      Fluid: Small volume free fluid.    IMPRESSION:    No explanation for right lower quadrant pain on this pelvic ultrasound.    No massive hemoperitoneum or suspicious adnexal mass.  Pregnancy of unknown location. Follow-up with serial beta hCGs. Repeat ultrasound as clinically indicated.    PHILIPPE MCCAULEY M.D., RADIOLOGY RESIDENT  This document has been electronically signed.  YOMAIRA MARCANO M.D, ATTENDING RADIOLOGIST  This document has been electronically signed. Sep 14 2020 12:02AM    < end of copied text >

## 2020-09-14 NOTE — ASU DISCHARGE PLAN (ADULT/PEDIATRIC) - FOLLOW UP APPOINTMENTS
911 or go to the nearest Emergency Room [facility]:/may also call Recovery Room (PACU) 24/7 @ [phone]

## 2020-09-14 NOTE — H&P ADULT - NSHPPHYSICALEXAM_GEN_ALL_CORE
Vital Signs Last 24 Hrs  T(C): 36.7 (13 Sep 2020 22:32), Max: 36.8 (13 Sep 2020 21:09)  T(F): 98 (13 Sep 2020 22:32), Max: 98.2 (13 Sep 2020 21:09)  HR: 74 (13 Sep 2020 22:32) (74 - 116)  BP: 121/71 (13 Sep 2020 22:32) (121/71 - 131/79)  BP(mean): --  RR: 18 (13 Sep 2020 22:32) (17 - 18)  SpO2: 100% (13 Sep 2020 22:32) (100% - 100%)    Physical Exam:   General: sitting comftorably in bed, NAD   HEENT: neck supple, full ROM  CV: RR S1S2 no m/r/g  Lungs: CTA b/l, good air flow b/l   Back: No CVA tenderness  Abd: Soft, non-tender, non-distended.  Bowel sounds present.    :  POS bleeding on pad.    External labia wnl.  Bimanual exam with POS cervical motion tenderness, but uterus wnl, adnexa non palpable b/l.  Cervix closed  Ext: non-tender b/l, no edema

## 2020-09-14 NOTE — BRIEF OPERATIVE NOTE - NSICDXBRIEFPROCEDURE_GEN_ALL_CORE_FT
PROCEDURES:  Right salpingectomy for ectopic pregnancy 14-Sep-2020 06:07:06  Marbin Oliva  Diagnostic laparoscopy 14-Sep-2020 06:06:45  Marbin Oliva

## 2020-09-14 NOTE — CONSULT NOTE ADULT - ASSESSMENT
A/P   24y   presents with c/o vaginal bleeding and abdominal pain cramping  found to have a b-HCG of 1350.  Differential includes threatened AB vs. ectopic pregnancy vs. viable IUP vs. spontanous  in progress.  Pt is having significant abdominal pain and a diagnostic laparoscopy is warranted.  - NPO since 7pm  - For diagnostic laparoscopy.  - IV pain control.    dw  and Dr.Korman Marbin Oliva PGY2
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
## 2020-09-14 NOTE — H&P ADULT - ATTENDING COMMENTS
25y/o    c/o vaginal bleeding and abdominal pain found to have a b-HCG of 1350.  Exam concerning for tubal ectopic pregnancy - for diagnostic laparoscopy.

## 2020-09-14 NOTE — ED ADULT NURSE REASSESSMENT NOTE - NS ED NURSE REASSESS COMMENT FT1
Patient A&Ox4, IV fluids infusing. Respirations even and unlabored. Report given to OR KATYA Pinedo. Valuables brought to security, nonvaluables secured outside room 21 in ED.

## 2020-09-29 ENCOUNTER — EMERGENCY (EMERGENCY)
Facility: HOSPITAL | Age: 25
LOS: 1 days | Discharge: HOME | End: 2020-09-29
Attending: EMERGENCY MEDICINE
Payer: COMMERCIAL

## 2020-09-29 VITALS
OXYGEN SATURATION: 100 % | TEMPERATURE: 98 F | RESPIRATION RATE: 25 BRPM | HEART RATE: 108 BPM | DIASTOLIC BLOOD PRESSURE: 58 MMHG | SYSTOLIC BLOOD PRESSURE: 99 MMHG | HEIGHT: 65 IN

## 2020-09-29 DIAGNOSIS — Z98.89 OTHER SPECIFIED POSTPROCEDURAL STATES: Chronic | ICD-10-CM

## 2020-09-29 DIAGNOSIS — M75.100 UNSPECIFIED ROTATOR CUFF TEAR OR RUPTURE OF UNSPECIFIED SHOULDER, NOT SPECIFIED AS TRAUMATIC: Chronic | ICD-10-CM

## 2020-09-29 LAB
ALBUMIN SERPL ELPH-MCNC: 4.6 G/DL — SIGNIFICANT CHANGE UP (ref 3.3–5)
ALP SERPL-CCNC: 47 U/L — SIGNIFICANT CHANGE UP (ref 40–120)
ALT FLD-CCNC: 12 U/L — SIGNIFICANT CHANGE UP (ref 10–45)
ANION GAP SERPL CALC-SCNC: 14 MMOL/L — SIGNIFICANT CHANGE UP (ref 5–17)
AST SERPL-CCNC: 47 U/L — HIGH (ref 10–40)
BASOPHILS # BLD AUTO: 0.02 K/UL — SIGNIFICANT CHANGE UP (ref 0–0.2)
BASOPHILS NFR BLD AUTO: 0.2 % — SIGNIFICANT CHANGE UP (ref 0–2)
BILIRUB SERPL-MCNC: 0.9 MG/DL — SIGNIFICANT CHANGE UP (ref 0.2–1.2)
BUN SERPL-MCNC: 10 MG/DL — SIGNIFICANT CHANGE UP (ref 7–23)
CALCIUM SERPL-MCNC: 9.8 MG/DL — SIGNIFICANT CHANGE UP (ref 8.4–10.5)
CHLORIDE SERPL-SCNC: 106 MMOL/L — SIGNIFICANT CHANGE UP (ref 96–108)
CO2 SERPL-SCNC: 20 MMOL/L — LOW (ref 22–31)
CREAT SERPL-MCNC: 0.67 MG/DL — SIGNIFICANT CHANGE UP (ref 0.5–1.3)
EOSINOPHIL # BLD AUTO: 0.01 K/UL — SIGNIFICANT CHANGE UP (ref 0–0.5)
EOSINOPHIL NFR BLD AUTO: 0.1 % — SIGNIFICANT CHANGE UP (ref 0–6)
GLUCOSE SERPL-MCNC: 89 MG/DL — SIGNIFICANT CHANGE UP (ref 70–99)
HCT VFR BLD CALC: 38.5 % — SIGNIFICANT CHANGE UP (ref 34.5–45)
HGB BLD-MCNC: 13 G/DL — SIGNIFICANT CHANGE UP (ref 11.5–15.5)
IMM GRANULOCYTES NFR BLD AUTO: 0.4 % — SIGNIFICANT CHANGE UP (ref 0–1.5)
LYMPHOCYTES # BLD AUTO: 0.73 K/UL — LOW (ref 1–3.3)
LYMPHOCYTES # BLD AUTO: 6 % — LOW (ref 13–44)
MCHC RBC-ENTMCNC: 31.3 PG — SIGNIFICANT CHANGE UP (ref 27–34)
MCHC RBC-ENTMCNC: 33.8 GM/DL — SIGNIFICANT CHANGE UP (ref 32–36)
MCV RBC AUTO: 92.5 FL — SIGNIFICANT CHANGE UP (ref 80–100)
MONOCYTES # BLD AUTO: 0.66 K/UL — SIGNIFICANT CHANGE UP (ref 0–0.9)
MONOCYTES NFR BLD AUTO: 5.5 % — SIGNIFICANT CHANGE UP (ref 2–14)
NEUTROPHILS # BLD AUTO: 10.61 K/UL — HIGH (ref 1.8–7.4)
NEUTROPHILS NFR BLD AUTO: 87.8 % — HIGH (ref 43–77)
NRBC # BLD: 0 /100 WBCS — SIGNIFICANT CHANGE UP (ref 0–0)
PLATELET # BLD AUTO: 267 K/UL — SIGNIFICANT CHANGE UP (ref 150–400)
POTASSIUM SERPL-MCNC: 4 MMOL/L — SIGNIFICANT CHANGE UP (ref 3.5–5.3)
POTASSIUM SERPL-SCNC: 4 MMOL/L — SIGNIFICANT CHANGE UP (ref 3.5–5.3)
PROT SERPL-MCNC: 7.6 G/DL — SIGNIFICANT CHANGE UP (ref 6–8.3)
RBC # BLD: 4.16 M/UL — SIGNIFICANT CHANGE UP (ref 3.8–5.2)
RBC # FLD: 12.8 % — SIGNIFICANT CHANGE UP (ref 10.3–14.5)
SODIUM SERPL-SCNC: 140 MMOL/L — SIGNIFICANT CHANGE UP (ref 135–145)
WBC # BLD: 12.08 K/UL — HIGH (ref 3.8–10.5)
WBC # FLD AUTO: 12.08 K/UL — HIGH (ref 3.8–10.5)

## 2020-09-29 PROCEDURE — 73130 X-RAY EXAM OF HAND: CPT | Mod: 26,LT

## 2020-09-29 PROCEDURE — 72125 CT NECK SPINE W/O DYE: CPT | Mod: 26

## 2020-09-29 PROCEDURE — 73564 X-RAY EXAM KNEE 4 OR MORE: CPT | Mod: 26,LT

## 2020-09-29 PROCEDURE — 73060 X-RAY EXAM OF HUMERUS: CPT | Mod: 26,LT

## 2020-09-29 PROCEDURE — 70450 CT HEAD/BRAIN W/O DYE: CPT | Mod: 26

## 2020-09-29 PROCEDURE — 73552 X-RAY EXAM OF FEMUR 2/>: CPT | Mod: 26,LT

## 2020-09-29 PROCEDURE — 73502 X-RAY EXAM HIP UNI 2-3 VIEWS: CPT | Mod: 26,LT

## 2020-09-29 PROCEDURE — 73590 X-RAY EXAM OF LOWER LEG: CPT | Mod: 26,LT

## 2020-09-29 PROCEDURE — 74177 CT ABD & PELVIS W/CONTRAST: CPT | Mod: 26

## 2020-09-29 PROCEDURE — 73080 X-RAY EXAM OF ELBOW: CPT | Mod: 26,LT

## 2020-09-29 PROCEDURE — 99285 EMERGENCY DEPT VISIT HI MDM: CPT

## 2020-09-29 PROCEDURE — 71260 CT THORAX DX C+: CPT | Mod: 26

## 2020-09-29 PROCEDURE — 76705 ECHO EXAM OF ABDOMEN: CPT | Mod: 26

## 2020-09-29 PROCEDURE — 72100 X-RAY EXAM L-S SPINE 2/3 VWS: CPT | Mod: 26

## 2020-09-29 PROCEDURE — 73090 X-RAY EXAM OF FOREARM: CPT | Mod: 26,LT

## 2020-09-29 PROCEDURE — 73030 X-RAY EXAM OF SHOULDER: CPT | Mod: 26,LT

## 2020-09-29 RX ORDER — MORPHINE SULFATE 50 MG/1
4 CAPSULE, EXTENDED RELEASE ORAL ONCE
Refills: 0 | Status: DISCONTINUED | OUTPATIENT
Start: 2020-09-29 | End: 2020-09-29

## 2020-09-29 RX ORDER — ONDANSETRON 8 MG/1
4 TABLET, FILM COATED ORAL ONCE
Refills: 0 | Status: COMPLETED | OUTPATIENT
Start: 2020-09-29 | End: 2020-09-29

## 2020-09-29 RX ORDER — KETOROLAC TROMETHAMINE 30 MG/ML
30 SYRINGE (ML) INJECTION ONCE
Refills: 0 | Status: DISCONTINUED | OUTPATIENT
Start: 2020-09-29 | End: 2020-09-29

## 2020-09-29 RX ADMIN — MORPHINE SULFATE 4 MILLIGRAM(S): 50 CAPSULE, EXTENDED RELEASE ORAL at 21:45

## 2020-09-29 RX ADMIN — MORPHINE SULFATE 4 MILLIGRAM(S): 50 CAPSULE, EXTENDED RELEASE ORAL at 22:15

## 2020-09-29 RX ADMIN — ONDANSETRON 4 MILLIGRAM(S): 8 TABLET, FILM COATED ORAL at 21:58

## 2020-09-29 RX ADMIN — Medication 30 MILLIGRAM(S): at 21:58
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
## 2020-09-29 NOTE — ED PROVIDER NOTE - PROGRESS NOTE DETAILS
Jak CABRALES (PGY-1)   radiology called - small apical pneumothroax, pt saturating well on RA, start on high flow O2, repeat CXR in 6 hrs Jak CABRALES (PGY-1)   pt's pain much improved. placed on nonrebreather at 10L Jak CABRALES (PGY-1)   pt resting comfortably in bed w/o any new complaints. lidocaine patches placed by nurse. as per nurse, pt able to ambulate to bathroom Jak CABRALES (PGY-1)   pt resting comfortably in bed w/o any new complaints. lidocaine patches placed by nurse. as per nurse, pt able to ambulate to bathroom. also states she feels safe at home Jak CABRALES (PGY-1)   pt able to ambulate. symptoms improved. will d/c to home with return precautions
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
## 2020-09-29 NOTE — ED ADULT NURSE NOTE - NSIMPLEMENTINTERV_GEN_ALL_ED
Implemented All Fall Risk Interventions:  [place] to call system. Call bell, personal items and telephone within reach. Instruct patient to call for assistance. Room bathroom lighting operational. Non-slip footwear when patient is off stretcher. Physically safe environment: no spills, clutter or unnecessary equipment. Stretcher in lowest position, wheels locked, appropriate side rails in place. Provide visual cue, wrist band, yellow gown, etc. Monitor gait and stability. Monitor for mental status changes and reorient to person, place, and time. Review medications for side effects contributing to fall risk. Reinforce activity limits and safety measures with patient and family.
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
## 2020-09-29 NOTE — ED PROVIDER NOTE - PHYSICAL EXAMINATION
Physical Exam:  Gen: in distress due to pain, awake alert   Head: NCAT  HEENT: EOMI, PEERL, normal conjunctiva, oral mucosa moist  Lung: CTAB, no respiratory distress, no wheezes/rhonchi/rales B/L, speaking in full sentences  CV: tenderness to L lower chest wall, RRR, no murmurs, rubs or gallops  Abd: soft, NT, ND, no guarding, no rigidity, no rebound tenderness, no CVA tenderness   MSK: no visible deformities, ROM limited in L LE due to pain in L hip, tenderness to palpation of L arm/L hip/L leg, no spinal tenderness   Neuro: No focal sensory deficits  Skin: Warm, well perfused, no rash, no leg swelling  Psych: normal affect, calm  ~Saeed Benedict MD (PGY-1)
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
## 2020-09-29 NOTE — ED PROVIDER NOTE - PATIENT PORTAL LINK FT
You can access the FollowMyHealth Patient Portal offered by [facility] by registering at the following website: [email or web address]. By joining [employer]’s FollowMyHealth portal, you will also be able to view your health information using other applications (apps) compatible with our system.
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
## 2020-09-29 NOTE — ED PROVIDER NOTE - NSFOLLOWUPCLINICS_GEN_ALL_ED_FT
[facility] General Internal Medicine  General Internal Medicine  [address] [id number]  Phone: [phone]  Fax:   Follow Up Time:

## 2020-09-29 NOTE — ED PROVIDER NOTE - NS ED ROS FT
CONST: no fevers, no chills  EYES: no pain, no vision changes  ENT: no sore throat, no ear pain, no change in hearing  CV: +chest wall pain, no leg swelling  RESP: no shortness of breath, no cough  ABD: no abdominal pain, no nausea, no vomiting, no diarrhea  : no dysuria, no flank pain, no hematuria  MSK: no back pain, +extremity pain  NEURO: no headache or additional neurologic complaints  HEME: no easy bleeding  SKIN:  no rash
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
## 2020-09-29 NOTE — ED PROVIDER NOTE - NSFOLLOWUPINSTRUCTIONS_ED_ALL_ED_FT
Please take over the counter pain medications such as motrin (600mg every 6 hours) and tylenol (650mg every 4 hours) for pain and follow up with your primary care doctor within 48-72 hours.    Please return to the Emergency Department if you experience worsening pain, shortness of breath, chest pain, nausea/vomiting, inability to walk, fevers/chills, abdominal pain, or any other abnormal symptoms.        Rib Fracture    WHAT YOU NEED TO KNOW:    A rib fracture is a crack or break in a rib bone. Rib fractures usually heal within 6 weeks. You should be able to return to normal activities before that time. Do not wrap anything around your body to try to splint your ribs. This can prevent you from taking deep breaths and increases your risk for pneumonia.         DISCHARGE INSTRUCTIONS:    Call 911 for any of the following:   •You have trouble breathing.      •You have new or increased pain.      Seek care immediately if:   •Your pain does not get better, even after treatment.      •You have a fever or a cough.       Contact your healthcare provider if:   •You have questions or concerns about your condition or care.          Medicines:   •NSAIDs help decrease swelling and pain. NSAIDs are available without a doctor's order. Ask your healthcare provider which medicine is right for you. Ask how much to take and when to take it. Take as directed. NSAIDs can cause stomach bleeding and kidney problems if not taken correctly.      •Prescription pain medicine may be given. Ask your healthcare provider how to take this medicine safely. Some prescription pain medicines contain acetaminophen. Do not take other medicines that contain acetaminophen without talking to your healthcare provider. Too much acetaminophen may cause liver damage. Prescription pain medicine may cause constipation. Ask your healthcare provider how to prevent or treat constipation.       •Take your medicine as directed. Contact your healthcare provider if you think your medicine is not helping or if you have side effects. Tell him or her if you are allergic to any medicine. Keep a list of the medicines, vitamins, and herbs you take. Include the amounts, and when and why you take them. Bring the list or the pill bottles to follow-up visits. Carry your medicine list with you in case of an emergency.      Follow up with your healthcare provider as directed: Write down your questions so you remember to ask them during your visits.    Deep breathing:  Deep breathing will decrease your risk for pneumonia. Hug a pillow on the injured side while doing this exercise, to decrease pain. Take a deep breath and hold it for as long as possible. You should let the air out and then cough strongly. Deep breaths help open your airway. You may be given an incentive spirometer to help take deep breaths. Put the plastic piece in your mouth. Take a slow, deep breath. You should then let the air out and cough. Repeat these steps 10 times every hour.     Rest: Rest and limit activity to decrease swelling and pain, and allow your injury to heal. Avoid activities that may cause more pain or damage to your ribs such as, pulling, pushing, and lifting. As your pain decreases, begin movements slowly. Take short walks between rest periods.    Ice: Apply ice on the fractured area for 15 to 20 minutes every hour or as directed. Use an ice pack or put crushed ice in a plastic bag. Cover it with a towel. Ice helps prevent tissue damage and decreases swelling and pain.

## 2020-09-29 NOTE — ED PROVIDER NOTE - NS ED ATTENDING STATEMENT MOD
I have personally seen and examined this patient.  I have fully participated in the care of this patient. I have reviewed all pertinent clinical information, including history, physical exam, plan and the Resident’s note and agree except as noted.

## 2020-09-30 VITALS
DIASTOLIC BLOOD PRESSURE: 71 MMHG | RESPIRATION RATE: 18 BRPM | OXYGEN SATURATION: 100 % | SYSTOLIC BLOOD PRESSURE: 110 MMHG | HEART RATE: 68 BPM

## 2020-09-30 PROBLEM — N80.9 ENDOMETRIOSIS, UNSPECIFIED: Chronic | Status: ACTIVE | Noted: 2020-09-13

## 2020-09-30 PROCEDURE — 99284 EMERGENCY DEPT VISIT MOD MDM: CPT | Mod: 25

## 2020-09-30 PROCEDURE — 73080 X-RAY EXAM OF ELBOW: CPT

## 2020-09-30 PROCEDURE — 80053 COMPREHEN METABOLIC PANEL: CPT

## 2020-09-30 PROCEDURE — 71260 CT THORAX DX C+: CPT

## 2020-09-30 PROCEDURE — 73564 X-RAY EXAM KNEE 4 OR MORE: CPT

## 2020-09-30 PROCEDURE — 73030 X-RAY EXAM OF SHOULDER: CPT

## 2020-09-30 PROCEDURE — 74177 CT ABD & PELVIS W/CONTRAST: CPT

## 2020-09-30 PROCEDURE — 73090 X-RAY EXAM OF FOREARM: CPT

## 2020-09-30 PROCEDURE — 73502 X-RAY EXAM HIP UNI 2-3 VIEWS: CPT

## 2020-09-30 PROCEDURE — 84702 CHORIONIC GONADOTROPIN TEST: CPT

## 2020-09-30 PROCEDURE — 73130 X-RAY EXAM OF HAND: CPT

## 2020-09-30 PROCEDURE — 73590 X-RAY EXAM OF LOWER LEG: CPT

## 2020-09-30 PROCEDURE — 70450 CT HEAD/BRAIN W/O DYE: CPT

## 2020-09-30 PROCEDURE — 85025 COMPLETE CBC W/AUTO DIFF WBC: CPT

## 2020-09-30 PROCEDURE — 71045 X-RAY EXAM CHEST 1 VIEW: CPT | Mod: 26

## 2020-09-30 PROCEDURE — 71045 X-RAY EXAM CHEST 1 VIEW: CPT

## 2020-09-30 PROCEDURE — 72125 CT NECK SPINE W/O DYE: CPT

## 2020-09-30 PROCEDURE — 73060 X-RAY EXAM OF HUMERUS: CPT

## 2020-09-30 PROCEDURE — 72100 X-RAY EXAM L-S SPINE 2/3 VWS: CPT

## 2020-09-30 PROCEDURE — 96374 THER/PROPH/DIAG INJ IV PUSH: CPT | Mod: XU

## 2020-09-30 PROCEDURE — 73552 X-RAY EXAM OF FEMUR 2/>: CPT

## 2020-09-30 PROCEDURE — 96376 TX/PRO/DX INJ SAME DRUG ADON: CPT

## 2020-09-30 PROCEDURE — 76705 ECHO EXAM OF ABDOMEN: CPT

## 2020-09-30 PROCEDURE — 96375 TX/PRO/DX INJ NEW DRUG ADDON: CPT | Mod: XU

## 2020-09-30 RX ORDER — KETOROLAC TROMETHAMINE 30 MG/ML
15 SYRINGE (ML) INJECTION ONCE
Refills: 0 | Status: DISCONTINUED | OUTPATIENT
Start: 2020-09-30 | End: 2020-09-30

## 2020-09-30 RX ORDER — LIDOCAINE 4 G/100G
2 CREAM TOPICAL ONCE
Refills: 0 | Status: COMPLETED | OUTPATIENT
Start: 2020-09-30 | End: 2020-09-30

## 2020-09-30 RX ORDER — ACETAMINOPHEN 500 MG
650 TABLET ORAL ONCE
Refills: 0 | Status: COMPLETED | OUTPATIENT
Start: 2020-09-30 | End: 2020-09-30

## 2020-09-30 RX ORDER — ONDANSETRON 8 MG/1
4 TABLET, FILM COATED ORAL ONCE
Refills: 0 | Status: COMPLETED | OUTPATIENT
Start: 2020-09-30 | End: 2020-09-30

## 2020-09-30 RX ADMIN — Medication 650 MILLIGRAM(S): at 02:58

## 2020-09-30 RX ADMIN — Medication 15 MILLIGRAM(S): at 04:43

## 2020-09-30 RX ADMIN — LIDOCAINE 2 PATCH: 4 CREAM TOPICAL at 02:46

## 2020-09-30 RX ADMIN — ONDANSETRON 4 MILLIGRAM(S): 8 TABLET, FILM COATED ORAL at 02:50

## 2020-09-30 NOTE — ED ADULT NURSE REASSESSMENT NOTE - NS ED NURSE REASSESS COMMENT FT1
pt resting on stretcher, pt Is alert and oriented x3, speaking full clear sentences, respirations non-labored, skin warm dry and intact, strong pulses throughout.  pt denies any shortness of breath,  pt reporting left rib pain and chest tightness , pt reports pain is a 7/10 but pt reports 'pain was a 30/10 before pain medicine".  pain is now tolerable per pt.  pt denies any needs rn will continue to monitor.

## 2020-10-05 ENCOUNTER — EMERGENCY (EMERGENCY)
Facility: HOSPITAL | Age: 25
LOS: 1 days | Discharge: HOME | End: 2020-10-05
Attending: EMERGENCY MEDICINE
Payer: COMMERCIAL

## 2020-10-05 VITALS
HEART RATE: 66 BPM | TEMPERATURE: 98 F | SYSTOLIC BLOOD PRESSURE: 133 MMHG | OXYGEN SATURATION: 96 % | RESPIRATION RATE: 20 BRPM | DIASTOLIC BLOOD PRESSURE: 77 MMHG

## 2020-10-05 VITALS
OXYGEN SATURATION: 100 % | TEMPERATURE: 99 F | WEIGHT: 119.93 LBS | RESPIRATION RATE: 18 BRPM | HEART RATE: 84 BPM | SYSTOLIC BLOOD PRESSURE: 117 MMHG | HEIGHT: 65 IN | DIASTOLIC BLOOD PRESSURE: 81 MMHG

## 2020-10-05 DIAGNOSIS — M75.100 UNSPECIFIED ROTATOR CUFF TEAR OR RUPTURE OF UNSPECIFIED SHOULDER, NOT SPECIFIED AS TRAUMATIC: Chronic | ICD-10-CM

## 2020-10-05 DIAGNOSIS — Z98.89 OTHER SPECIFIED POSTPROCEDURAL STATES: Chronic | ICD-10-CM

## 2020-10-05 LAB
ALBUMIN SERPL ELPH-MCNC: 4.7 G/DL — SIGNIFICANT CHANGE UP (ref 3.3–5)
ALP SERPL-CCNC: 54 U/L — SIGNIFICANT CHANGE UP (ref 40–120)
ALT FLD-CCNC: 15 U/L — SIGNIFICANT CHANGE UP (ref 10–45)
ANION GAP SERPL CALC-SCNC: 10 MMOL/L — SIGNIFICANT CHANGE UP (ref 5–17)
AST SERPL-CCNC: 21 U/L — SIGNIFICANT CHANGE UP (ref 10–40)
BASOPHILS # BLD AUTO: 0 K/UL — SIGNIFICANT CHANGE UP (ref 0–0.2)
BASOPHILS NFR BLD AUTO: 0 % — SIGNIFICANT CHANGE UP (ref 0–2)
BILIRUB SERPL-MCNC: 0.5 MG/DL — SIGNIFICANT CHANGE UP (ref 0.2–1.2)
BUN SERPL-MCNC: 9 MG/DL — SIGNIFICANT CHANGE UP (ref 7–23)
CALCIUM SERPL-MCNC: 10 MG/DL — SIGNIFICANT CHANGE UP (ref 8.4–10.5)
CHLORIDE SERPL-SCNC: 102 MMOL/L — SIGNIFICANT CHANGE UP (ref 96–108)
CO2 SERPL-SCNC: 27 MMOL/L — SIGNIFICANT CHANGE UP (ref 22–31)
CREAT SERPL-MCNC: 0.61 MG/DL — SIGNIFICANT CHANGE UP (ref 0.5–1.3)
EOSINOPHIL # BLD AUTO: 0.1 K/UL — SIGNIFICANT CHANGE UP (ref 0–0.5)
EOSINOPHIL NFR BLD AUTO: 2.8 % — SIGNIFICANT CHANGE UP (ref 0–6)
GLUCOSE SERPL-MCNC: 79 MG/DL — SIGNIFICANT CHANGE UP (ref 70–99)
HCT VFR BLD CALC: 38 % — SIGNIFICANT CHANGE UP (ref 34.5–45)
HGB BLD-MCNC: 12.1 G/DL — SIGNIFICANT CHANGE UP (ref 11.5–15.5)
IMM GRANULOCYTES NFR BLD AUTO: 0.3 % — SIGNIFICANT CHANGE UP (ref 0–1.5)
LYMPHOCYTES # BLD AUTO: 1.46 K/UL — SIGNIFICANT CHANGE UP (ref 1–3.3)
LYMPHOCYTES # BLD AUTO: 41.1 % — SIGNIFICANT CHANGE UP (ref 13–44)
MCHC RBC-ENTMCNC: 30.2 PG — SIGNIFICANT CHANGE UP (ref 27–34)
MCHC RBC-ENTMCNC: 31.8 GM/DL — LOW (ref 32–36)
MCV RBC AUTO: 94.8 FL — SIGNIFICANT CHANGE UP (ref 80–100)
MONOCYTES # BLD AUTO: 0.31 K/UL — SIGNIFICANT CHANGE UP (ref 0–0.9)
MONOCYTES NFR BLD AUTO: 8.7 % — SIGNIFICANT CHANGE UP (ref 2–14)
NEUTROPHILS # BLD AUTO: 1.67 K/UL — LOW (ref 1.8–7.4)
NEUTROPHILS NFR BLD AUTO: 47.1 % — SIGNIFICANT CHANGE UP (ref 43–77)
NRBC # BLD: 0 /100 WBCS — SIGNIFICANT CHANGE UP (ref 0–0)
PLATELET # BLD AUTO: 218 K/UL — SIGNIFICANT CHANGE UP (ref 150–400)
POTASSIUM SERPL-MCNC: 3.9 MMOL/L — SIGNIFICANT CHANGE UP (ref 3.5–5.3)
POTASSIUM SERPL-SCNC: 3.9 MMOL/L — SIGNIFICANT CHANGE UP (ref 3.5–5.3)
PROT SERPL-MCNC: 7.8 G/DL — SIGNIFICANT CHANGE UP (ref 6–8.3)
RBC # BLD: 4.01 M/UL — SIGNIFICANT CHANGE UP (ref 3.8–5.2)
RBC # FLD: 12.5 % — SIGNIFICANT CHANGE UP (ref 10.3–14.5)
SODIUM SERPL-SCNC: 139 MMOL/L — SIGNIFICANT CHANGE UP (ref 135–145)
WBC # BLD: 3.55 K/UL — LOW (ref 3.8–10.5)
WBC # FLD AUTO: 3.55 K/UL — LOW (ref 3.8–10.5)

## 2020-10-05 PROCEDURE — 80053 COMPREHEN METABOLIC PANEL: CPT

## 2020-10-05 PROCEDURE — 85025 COMPLETE CBC W/AUTO DIFF WBC: CPT

## 2020-10-05 PROCEDURE — 99284 EMERGENCY DEPT VISIT MOD MDM: CPT | Mod: 25

## 2020-10-05 PROCEDURE — 71046 X-RAY EXAM CHEST 2 VIEWS: CPT | Mod: 26

## 2020-10-05 PROCEDURE — 96374 THER/PROPH/DIAG INJ IV PUSH: CPT

## 2020-10-05 PROCEDURE — 99284 EMERGENCY DEPT VISIT MOD MDM: CPT

## 2020-10-05 PROCEDURE — 71046 X-RAY EXAM CHEST 2 VIEWS: CPT

## 2020-10-05 RX ORDER — ACETAMINOPHEN 500 MG
975 TABLET ORAL ONCE
Refills: 0 | Status: COMPLETED | OUTPATIENT
Start: 2020-10-05 | End: 2020-10-05

## 2020-10-05 RX ORDER — KETOROLAC TROMETHAMINE 30 MG/ML
15 SYRINGE (ML) INJECTION ONCE
Refills: 0 | Status: DISCONTINUED | OUTPATIENT
Start: 2020-10-05 | End: 2020-10-05

## 2020-10-05 RX ORDER — OXYCODONE HYDROCHLORIDE 5 MG/1
1 TABLET ORAL
Qty: 12 | Refills: 0
Start: 2020-10-05 | End: 2020-10-07

## 2020-10-05 RX ORDER — ACETAMINOPHEN 500 MG
2 TABLET ORAL
Qty: 112 | Refills: 0
Start: 2020-10-05 | End: 2020-10-18

## 2020-10-05 RX ORDER — OXYCODONE HYDROCHLORIDE 5 MG/1
5 TABLET ORAL ONCE
Refills: 0 | Status: DISCONTINUED | OUTPATIENT
Start: 2020-10-05 | End: 2020-10-05

## 2020-10-05 RX ADMIN — OXYCODONE HYDROCHLORIDE 5 MILLIGRAM(S): 5 TABLET ORAL at 19:16

## 2020-10-05 RX ADMIN — Medication 975 MILLIGRAM(S): at 16:58

## 2020-10-05 RX ADMIN — Medication 15 MILLIGRAM(S): at 16:58

## 2020-10-05 NOTE — ED PROVIDER NOTE - NS ED ROS FT
Constitutional: No fever or chills  Eyes: No visual changes  HEENT: No throat pain  CV: L chest wall pain  Resp: N+SOB, no cough  GI: No abd pain, nausea or vomiting  : No dysuria  MSK: No musculoskeletal pain  Skin: No rash  Neuro: No headache
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
## 2020-10-05 NOTE — ED ADULT NURSE REASSESSMENT NOTE - NS ED NURSE REASSESS COMMENT FT1
Pt received from KATYA Adler in [place]. Pt remains in the ED. Resting comfortably in bed. VSS. NAD. AOx4. Breathing unlabored and spontaneously, sating 100% on room air. S1 and S2 heard. No peripheral edema. Peripheral pulses strong bilaterally. Abdomen soft, nontender and nondistended. Positive bowel sounds in all 4 quadrants. Pt safety maintained. Awaiting dispo. Will continue to monitor. Vital signs stable. Constant sighing. O2 checked, was 99%. Working on breastfeeding every 2-3 hours. BF fair. Age appropriate voids. Awaiting first stool. Bath done, temps stable.Parents instructed to call with questions or concerns. Will continue to monitor.

## 2020-10-05 NOTE — ED PROVIDER NOTE - PROGRESS NOTE DETAILS
Case d/w pulmonologist, Dr. Mena. He states that pulmonary function tests showed restrictive pattern, likely from splinting, however sent to ED to eval for pneumothorax or hemothorax. - Joaquim Berrios MD K[facility] Alonso PGY3  dr. aguirre spoken to, rec f/u tomm, patient has improved pain sx with meds, will dc with pain control, incentive spirometry and pulm f/u tomm. hd and clinically stable for dc Pt feels improved. VSS. CXR and labs non-actionable, no pneumothorax or effusion. Asking to be d/c'd. Is going to f/u with pulmonologist tomorrow. Return precautions, importance of f/u discussed. An opportunity to ask questions was provided and all answered. - Joaquim Berrios MD

## 2020-10-05 NOTE — ED ADULT NURSE NOTE - CHPI ED NUR SYMPTOMS NEG
no pain/no weakness/no tingling/no dizziness/no nausea/no chills/no decreased eating/drinking/no fever/no vomiting

## 2020-10-05 NOTE — ED PROVIDER NOTE - MUSCULOSKELETAL, MLM
FROM bilat UE without bony or joint ttp. +TTP L anterior chest wall under L breast. Equal chest expansion.
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
## 2020-10-05 NOTE — ED PROVIDER NOTE - NSFOLLOWUPINSTRUCTIONS_ED_ALL_ED_FT
Activities as tolerated. Please encourage good oral and fluid intake. For pain, please take Tylenol 650mg every 6 hours as needed. For severe pain, please take oxycodone 5mg every 6 hours as needed.    Please rest, avoid excess exertion. Continue to use incentive spirometry 10 times every hour.    Please see your primary care doctor within 24-48 hours for further management of your symptoms.  Please follow up with pulmonologist tomorrow for further evaluation of your symptoms.    Please seek emergent medical management if you have any worsening signs or symptoms, such as worsening chest pain, difficulty breathing, loss of consciousness, or persistent vomiting.
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
## 2020-10-05 NOTE — ED PROVIDER NOTE - PATIENT PORTAL LINK FT
You can access the FollowMyHealth Patient Portal offered by [facility] by registering at the following website: [email or web address]. By joining [email or web address]’s FollowMyHealth portal, you will also be able to view your health information using other applications (apps) compatible with our system.

## 2020-10-06 LAB — SURGICAL PATHOLOGY STUDY: SIGNIFICANT CHANGE UP

## 2021-01-31 NOTE — ED ADULT TRIAGE NOTE - NS ED NOTE AC HIGH RISK COUNTRIES
POD#1 s/p primary  section for breech, and complicated by preeclampsia with severe features-  stable  on magnesium - continue until this afternoon, BPs stable, urine output adequate - once discontinued then d/c rodríguez and OOB  routine postop care  Fidelina Scott MD Discharge instructions reviewed, including plan for both Procardia and Labetalol and close follow up re blood pressures. No

## 2021-03-17 NOTE — END OF VISIT
[>50% of Time Spent on Counseling and Coordination of Care for  ___] : Greater than 50% of the encounter time was spent on counseling and coordination of care for [unfilled] [Time Spent: ___ minutes] : I have spent [unfilled] minutes of face to face time with the patient Female

## 2021-03-21 ENCOUNTER — TRANSCRIPTION ENCOUNTER (OUTPATIENT)
Age: 26
End: 2021-03-21

## 2021-03-23 NOTE — ED ADULT NURSE NOTE - NSFALLRSKPASTHIST_ED_ALL_ED
PATIENT REFILL PROTOCOL FOR THE FOLLOWING:  Name from pharmacy: ASPIRIN EC 81 MG TABLET          Will file in chart as: Aspirin Low Dose 81 MG EC tablet    Sig: TAKE 1 TABLET BY MOUTH ONCE DAILY    Disp:  30 tablet    Refills:  11    Start: 3/23/2021       APPTS AND LABS ARE UP-TO-DATE  LAST OFFICE VISIT: 3/1/2021  FOLLOW UP APPT: 3/24/2021    PROCESS SCRIPT FOR:  LAST REFILL DATE: 11/12/2019    
no

## 2021-04-22 ENCOUNTER — EMERGENCY (EMERGENCY)
Facility: HOSPITAL | Age: 26
LOS: 1 days | Discharge: LEFT BEFORE DISCHARGE | End: 2021-04-22
Admitting: EMERGENCY MEDICINE
Payer: COMMERCIAL

## 2021-04-22 VITALS
DIASTOLIC BLOOD PRESSURE: 75 MMHG | HEIGHT: 65 IN | TEMPERATURE: 98 F | RESPIRATION RATE: 18 BRPM | HEART RATE: 85 BPM | OXYGEN SATURATION: 100 % | SYSTOLIC BLOOD PRESSURE: 127 MMHG

## 2021-04-22 DIAGNOSIS — M75.100 UNSPECIFIED ROTATOR CUFF TEAR OR RUPTURE OF UNSPECIFIED SHOULDER, NOT SPECIFIED AS TRAUMATIC: Chronic | ICD-10-CM

## 2021-04-22 DIAGNOSIS — Z98.89 OTHER SPECIFIED POSTPROCEDURAL STATES: Chronic | ICD-10-CM

## 2021-04-22 PROCEDURE — L9991: CPT

## 2021-04-22 NOTE — ED ADULT TRIAGE NOTE - CHIEF COMPLAINT QUOTE
c/o right wrist pain, pt reports ws carrying a heavy item when hand twisted, reports cramping and increased sensitivity to the area, no swelling noted, no deformity.

## 2021-04-23 ENCOUNTER — EMERGENCY (EMERGENCY)
Facility: HOSPITAL | Age: 26
LOS: 1 days | Discharge: HOME | End: 2021-04-23
Attending: EMERGENCY MEDICINE | Admitting: EMERGENCY MEDICINE
Payer: MEDICAID

## 2021-04-23 VITALS
DIASTOLIC BLOOD PRESSURE: 71 MMHG | HEIGHT: 65 IN | HEART RATE: 78 BPM | OXYGEN SATURATION: 98 % | WEIGHT: 128.09 LBS | RESPIRATION RATE: 20 BRPM | TEMPERATURE: 99 F | SYSTOLIC BLOOD PRESSURE: 124 MMHG

## 2021-04-23 DIAGNOSIS — M75.100 UNSPECIFIED ROTATOR CUFF TEAR OR RUPTURE OF UNSPECIFIED SHOULDER, NOT SPECIFIED AS TRAUMATIC: Chronic | ICD-10-CM

## 2021-04-23 DIAGNOSIS — Z98.89 OTHER SPECIFIED POSTPROCEDURAL STATES: Chronic | ICD-10-CM

## 2021-04-23 PROCEDURE — 96372 THER/PROPH/DIAG INJ SC/IM: CPT

## 2021-04-23 PROCEDURE — 73110 X-RAY EXAM OF WRIST: CPT | Mod: 26,RT

## 2021-04-23 PROCEDURE — 99283 EMERGENCY DEPT VISIT LOW MDM: CPT | Mod: 25

## 2021-04-23 PROCEDURE — 29125 APPL SHORT ARM SPLINT STATIC: CPT | Mod: RT

## 2021-04-23 PROCEDURE — 73110 X-RAY EXAM OF WRIST: CPT

## 2021-04-23 PROCEDURE — 99283 EMERGENCY DEPT VISIT LOW MDM: CPT

## 2021-04-23 RX ORDER — KETOROLAC TROMETHAMINE 30 MG/ML
30 SYRINGE (ML) INJECTION ONCE
Refills: 0 | Status: DISCONTINUED | OUTPATIENT
Start: 2021-04-23 | End: 2021-04-23

## 2021-04-23 RX ORDER — PANTOPRAZOLE SODIUM 20 MG/1
1 TABLET, DELAYED RELEASE ORAL
Qty: 0 | Refills: 0 | DISCHARGE

## 2021-04-23 RX ORDER — OXYCODONE AND ACETAMINOPHEN 5; 325 MG/1; MG/1
1 TABLET ORAL ONCE
Refills: 0 | Status: DISCONTINUED | OUTPATIENT
Start: 2021-04-23 | End: 2021-04-23

## 2021-04-23 RX ORDER — ACETAMINOPHEN 500 MG
2 TABLET ORAL
Qty: 0 | Refills: 0 | DISCHARGE

## 2021-04-23 RX ORDER — TRIAMCINOLONE 4 MG
1 TABLET ORAL ONCE
Refills: 0 | Status: DISCONTINUED | OUTPATIENT
Start: 2021-04-23 | End: 2021-04-23

## 2021-04-23 RX ADMIN — OXYCODONE AND ACETAMINOPHEN 1 TABLET(S): 5; 325 TABLET ORAL at 01:39

## 2021-04-23 RX ADMIN — Medication 60 MILLIGRAM(S): at 01:09

## 2021-04-23 RX ADMIN — OXYCODONE AND ACETAMINOPHEN 1 TABLET(S): 5; 325 TABLET ORAL at 01:09

## 2021-04-23 RX ADMIN — Medication 30 MILLIGRAM(S): at 02:10

## 2021-04-23 RX ADMIN — Medication 30 MILLIGRAM(S): at 01:40

## 2021-04-23 NOTE — ED PROVIDER NOTE - CLINICAL SUMMARY MEDICAL DECISION MAKING FREE TEXT BOX
pt with h/o tendinitis of wrist p/w right wrsit pain for 3 days, did not allow to examine the wrist , was expecting local cortisol but pt was explained ED physician do not do given local steroid injection and no one can do it besides orthopedic and hand surgery, mean while I will give you oral steroid and narcotic pain medication and  you will be refereed to orthopedic and you can see them in morning and problem can be taken care . oral steroid works and it decreased inflammation in few hours,

## 2021-04-23 NOTE — ED ADULT NURSE NOTE - NSIMPLEMENTINTERV_GEN_ALL_ED
Implemented All Universal Safety Interventions:  [place] to call system. Call bell, personal items and telephone within reach. Instruct patient to call for assistance. Room bathroom lighting operational. Non-slip footwear when patient is off stretcher. Physically safe environment: no spills, clutter or unnecessary equipment. Stretcher in lowest position, wheels locked, appropriate side rails in place.

## 2021-04-23 NOTE — ED PROVIDER NOTE - OBJECTIVE STATEMENT
26 y/o f with h/o asthma presents to ED c/o right wrist pain for past 3 days , pain is dull , constant and severe , increased with movement, c/o inabilty to poen and close her fingers. has h/o tendinitis in same wrist.

## 2021-04-23 NOTE — ED PROVIDER NOTE - NS_EDPROVIDERDISPOUSERTYPE_ED_A_ED
Problem: Falls - Risk of 
Goal: *Absence of Falls Document Mary Carmen Steele Fall Risk and appropriate interventions in the flowsheet. Outcome: Progressing Towards Goal 
Fall Risk Interventions: 
Mobility Interventions: Patient to call before getting OOB Medication Interventions: Patient to call before getting OOB Elimination Interventions: Call light in reach Attending Attestation (For Attendings USE Only)...

## 2021-04-23 NOTE — ED PROVIDER NOTE - NSFOLLOWUPCLINICS_GEN_ALL_ED_FT
[facility]  Hand Surgery  [address]  [place] [address]  Phone: [phone]  Fax:

## 2021-04-23 NOTE — ED PROVIDER NOTE - PHYSICAL EXAMINATION
General:     NAD, well-nourished, well-appearing  Head:     NC/AT, EOMI, oral mucosa moist  Neck:     supple  Lungs:     CTA b/l, no w/r/r  CVS:     S1S2, RRR, no m/g/r  Abd:     +BS, s/nt/nd, no organomegaly  Ext:    2+ radial and pedal pulses, no c/c/e, right wrsit no gross deformity, mod edema , cap refill < 2sec, decreased ROM   Neuro: grossly intact

## 2021-04-23 NOTE — ED ADULT NURSE NOTE - OBJECTIVE STATEMENT
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 After lifting the pot, she felt a pain in her right wrist. Pain has worsened in the last 24hrs. Swelling noted in right wrist area. Pt states pain is 12/10 and is worse with movement. 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 After lifting the pot, she felt a pain in her right wrist. Pain has worsened in the last 24hrs. Swelling noted in right wrist area. Pt states pain is 12/10 and is worse with movement. Pt states she has a history of tendonitis and steroid injections as treatment.

## 2021-04-23 NOTE — ED PROVIDER NOTE - PATIENT PORTAL LINK FT
You can access the FollowMyHealth Patient Portal offered by [facility] by registering at the following website: [email or web address]. By joining [employer]’s FollowMyHealth portal, you will also be able to view your health information using other applications (apps) compatible with our system.

## 2021-04-23 NOTE — ED PROVIDER NOTE - NSFOLLOWUPINSTRUCTIONS_ED_ALL_ED_FT
Tendinitis    WHAT YOU NEED TO KNOW:    Tendinitis is painful inflammation or breakdown of your tendons. It may also be called tendinopathy. Tendinitis often occurs in the knee, shoulder, ankle, hip, or elbow.    DISCHARGE INSTRUCTIONS:    Medicines:   •Pain medicines such as acetaminophen or NSAIDs may decrease swelling and pain or fever. These medicines are available without a doctor's order. Ask which medicine to take. Ask how much to take and when to take it. Follow directions. Acetaminophen and NSAIDs can cause liver or kidney damage if not taken correctly. If you take blood thinner medicine, always ask your healthcare provider if NSAIDs are safe for you. Always read the medicine label and follow the directions on it before using these medicine.       •Take your medicine as directed. Contact your healthcare provider if you think your medicine is not helping or if you have side effects. Tell him if you are allergic to any medicine. Keep a list of the medicines, vitamins, and herbs you take. Include the amounts, and when and why you take them. Bring the list or the pill bottles to follow-up visits. Carry your medicine list with you in case of an emergency.      Management:   •Rest your tendon as directed to help it heal. Ask your healthcare provider if you need to stop putting weight on your affected area.      •Ice helps decrease swelling and pain. Ice may also help prevent tissue damage. Use an ice pack, or put crushed ice in a plastic bag. Cover it with a towel and place it on the affected area for 10 to 15 minutes every hour or as directed.      •Support devices such as a cane, splint, shoe insert, or brace may help reduce your pain.      •Physical therapy may be ordered by your healthcare provider. This may be used to teach you exercises to help improve movement and strength, and to decrease pain. You may also learn how to improve your posture, and how to lift or exercise correctly.      Prevention:   •Stretch and warm up before you exercise.       •Exercise regularly to strengthen the muscles around your joint. Ease into an exercise routine for the first 3 weeks to prevent another injury. Ask your healthcare provider about the best exercise plan for you. Rest fully between activities.      •Use the right equipment for sports and exercise. Wear braces or tape around weak joints as directed.      Follow up with your healthcare provider as directed: Write down your questions so you remember to ask them during your visits.     Contact your healthcare provider if:   •You have increased pain even after you take medicine.      •You have questions or concerns about your condition or care.      Return to the emergency department if:   •You have increased redness over the joint, or swelling in the joint.      •You suddenly cannot move your joint.         © Copyright [employer] 2021           back to top                          © Copyright [employer] 2021

## 2021-04-26 ENCOUNTER — TRANSCRIPTION ENCOUNTER (OUTPATIENT)
Age: 26
End: 2021-04-26

## 2021-04-29 ENCOUNTER — TRANSCRIPTION ENCOUNTER (OUTPATIENT)
Age: 26
End: 2021-04-29

## 2021-06-13 ENCOUNTER — TRANSCRIPTION ENCOUNTER (OUTPATIENT)
Age: 26
End: 2021-06-13

## 2022-04-21 NOTE — ED CDU PROVIDER INITIAL DAY NOTE - MUSCULOSKELETAL, MLM
Patient elects removal today, consent signed and aftercare instructions given. Spine appears normal, range of motion is not limited, no muscle or joint tenderness

## 2022-05-11 NOTE — ED ADULT TRIAGE NOTE - NSWEIGHTCALCTOOLDRUG_GEN_A_CORE
89 y/o Male with h/o colon Ca s/p resection ~15y ago, SSS ss/p PM, HTN, BPH, b/l THR was admitted on 5/7 for worsening mid-epigastric pain x 2d. Mild nausea, no vomiting/diarrhea/constipation. No fever/chills at home. No clear precipitating factor. On 5/5 he underwent an exploratory laparotomy and small bowel resection. He was given cefotetan IV. Postoperative, he became hypotensive, but improved with IV fluids. Alert, but confused. He remained on cefotetan.    1. SBO s/p SBR. CRF stage 3. Prior colon Ca s/p resection. B/l pleural effusions. Encephalopathy. Allergy to PCN.   -leukocytosis postoperative  - had respiratory distress earlier; possibly related to post op pneumonia  - on supplemental oxygen  -he is at risk for aspiration  - will change antibiotics to meropenem to cover for pneumonia as well as bowel justina  -monitor abdomen  -monitor temps  -f/u CBC  - to upgrade to monitored floor  -supportive care  2. Other issues:   -care per medicine            used

## 2022-05-30 ENCOUNTER — EMERGENCY (EMERGENCY)
Facility: HOSPITAL | Age: 27
LOS: 1 days | Discharge: HOME | End: 2022-05-30
Attending: EMERGENCY MEDICINE | Admitting: EMERGENCY MEDICINE
Payer: COMMERCIAL

## 2022-05-30 VITALS
TEMPERATURE: 98 F | SYSTOLIC BLOOD PRESSURE: 127 MMHG | OXYGEN SATURATION: 100 % | HEIGHT: 65 IN | RESPIRATION RATE: 18 BRPM | DIASTOLIC BLOOD PRESSURE: 70 MMHG | HEART RATE: 65 BPM

## 2022-05-30 DIAGNOSIS — M75.100 UNSPECIFIED ROTATOR CUFF TEAR OR RUPTURE OF UNSPECIFIED SHOULDER, NOT SPECIFIED AS TRAUMATIC: Chronic | ICD-10-CM

## 2022-05-30 DIAGNOSIS — Z98.89 OTHER SPECIFIED POSTPROCEDURAL STATES: Chronic | ICD-10-CM

## 2022-05-30 PROCEDURE — 99283 EMERGENCY DEPT VISIT LOW MDM: CPT

## 2022-05-30 RX ORDER — OXYCODONE HYDROCHLORIDE 5 MG/1
1 TABLET ORAL
Qty: 12 | Refills: 0
Start: 2022-05-30 | End: 2022-06-01

## 2022-05-30 RX ORDER — NAPROXEN AND ESOMEPRAZOLE MAGNESIUM 375; 20 MG/1; MG/1
1 TABLET, DELAYED RELEASE ORAL
Qty: 14 | Refills: 0
Start: 2022-05-30 | End: 2022-06-05

## 2022-05-30 RX ORDER — OXYCODONE AND ACETAMINOPHEN 5; 325 MG/1; MG/1
1 TABLET ORAL ONCE
Refills: 0 | Status: DISCONTINUED | OUTPATIENT
Start: 2022-05-30 | End: 2022-05-30

## 2022-05-30 RX ORDER — AMOXICILLIN 250 MG/5ML
1 SUSPENSION, RECONSTITUTED, ORAL (ML) ORAL
Qty: 18 | Refills: 0
Start: 2022-05-30 | End: 2022-06-04

## 2022-05-30 RX ORDER — AMOXICILLIN 250 MG/5ML
875 SUSPENSION, RECONSTITUTED, ORAL (ML) ORAL ONCE
Refills: 0 | Status: DISCONTINUED | OUTPATIENT
Start: 2022-05-30 | End: 2022-05-30

## 2022-05-30 RX ORDER — AMOXICILLIN 250 MG/5ML
1 SUSPENSION, RECONSTITUTED, ORAL (ML) ORAL
Qty: 10 | Refills: 0
Start: 2022-05-30 | End: 2022-06-03

## 2022-05-30 RX ORDER — OXYCODONE HYDROCHLORIDE 5 MG/1
5 TABLET ORAL ONCE
Refills: 0 | Status: DISCONTINUED | OUTPATIENT
Start: 2022-05-30 | End: 2022-05-30

## 2022-05-30 RX ORDER — AMOXICILLIN 250 MG/5ML
500 SUSPENSION, RECONSTITUTED, ORAL (ML) ORAL ONCE
Refills: 0 | Status: COMPLETED | OUTPATIENT
Start: 2022-05-30 | End: 2022-05-30

## 2022-05-30 RX ORDER — OXYCODONE HYDROCHLORIDE 5 MG/1
1 TABLET ORAL
Qty: 4 | Refills: 0
Start: 2022-05-30

## 2022-05-30 RX ADMIN — OXYCODONE AND ACETAMINOPHEN 1 TABLET(S): 5; 325 TABLET ORAL at 07:41

## 2022-05-30 RX ADMIN — Medication 500 MILLIGRAM(S): at 07:49

## 2022-05-30 RX ADMIN — OXYCODONE HYDROCHLORIDE 5 MILLIGRAM(S): 5 TABLET ORAL at 06:38

## 2022-05-30 NOTE — ED PROVIDER NOTE - PROGRESS NOTE DETAILS
larisa pgy1: received sign out on pt. still in significant pain shortly after oxycodone. d/w dental for pt to go to her dentist for correction of her procedure. will dc on amoxicillin and oxycodone around the clock until pt can see her dentist outpt tomorrow. vital signs stable safe for dc. pt with persistent pain. percocet ordered. pharmacy unable to fill order for 875 amoxicillin, 500mg ordered.

## 2022-05-30 NOTE — ED PROVIDER NOTE - NS ED ROS FT
Constitutional:  (-) fever, (-) chills, (-) lethargy  Eyes:  (-) eye pain (-) visual changes  ENMT: (-) nasal discharge, (-) sore throat. (-) neck pain or stiffness (+) dental pain   Cardiac: (-) chest pain (-) palpitations  Respiratory:  (-) cough (-) respiratory distress.   GI:  (-) nausea (-) vomiting (-) diarrhea (-) abdominal pain.  :  (-) dysuria (-) frequency (-) burning.  MS:  (-) back pain (-) joint pain.  Neuro:  (-) headache (-) numbness (-) tingling (-) focal weakness  Skin:  (-) rash  Except as documented in the HPI,  all other systems are negative

## 2022-05-30 NOTE — ED ADULT NURSE NOTE - OBJECTIVE STATEMENT
Pt c/o of left facial pain secondary to left upper teeth nerve pain. Pt states she had a temporary filling for  route canal, after eating a carrot, filling pushed up into the gum ans she started having severe pain. PT denies fever, chills, purulent drainage. Filling intact, go edema noted to gum site. Pt c/o of left facial pain secondary to left upper teeth nerve pain. Pt states she had a temporary filling for  route canal, after eating a carrot, filling pushed up into the gum ans she started having severe pain. PT denies fever, chills, purulent drainage. Filling intact, go edema noted to gum site. Took Tylenol no relief. Pt medicated for pain, awaiting dental, will monitor pt.

## 2022-05-30 NOTE — ED ADULT TRIAGE NOTE - CHIEF COMPLAINT QUOTE
dental pain    cracked a tooth on upper left side. has temporary filling but after eating a carrot, filling moved.  c/o severe pain.  private dentist not available.  pmhx ibs, endometriosis.

## 2022-05-30 NOTE — ED PROVIDER NOTE - PATIENT PORTAL LINK FT
You can access the FollowMyHealth Patient Portal offered by [facility] by registering at the following website: [email or web address]. By joining [employer]’s FollowMyHealth portal, you will also be able to view your health information using other applications (apps) compatible with our system.

## 2022-05-30 NOTE — PROGRESS NOTE ADULT - SUBJECTIVE AND OBJECTIVE BOX
· Chief Complaint: The patient is a 26y Female complaining of dental pain/injury.    · HPI Objective Statement: 26 y F, hx of IBS, presenting with 2-days onset of L upper dental pain. Patient reports that she had a temporary filling placed two weeks ago by Dr. Garrison Weir. Ate some carrots two days ago and felt like the filling is now pressing against the nerve. Spoke with the dentist and was recommended to come to the ER for evaluation. Denies fever, swelling.    Med HX:No pertinent family history in first degree relatives    No pertinent past medical history    Sciatica    Asthma    Ovarian cyst    Chronic gastritis    Endometriosis    Chronic gastritis    Pain, dental    No significant past surgical history    History of     Torn rotator cuff    History of     Torn rotator cuff    CRACKED TOOTH    90+    SysAdmin_VisitLink        RX:amoxicillin 875 mg oral tablet: 1 tab(s) orally every 12 hours   MethylPREDNISolone Dose Pack 4 mg oral tablet: take as directed on packet  oxyCODONE 5 mg oral capsule: 1 cap(s) orally every 6 hours MDD:4 tabs  oxycodone-acetaminophen 5 mg-325 mg oral tablet: 1 tab(s) orally every 6 hours, As Needed MDD:4  pantoprazole 40 mg oral delayed release tablet: 1 tab(s) orally once a day, As Needed  Vimovo 500 mg-20 mg oral delayed release tablet: 1 tab(s) orally 2 times a day       Social Hx: non-contributory    EOE:   TMJ (WNL)  Trismus (-)  LAD (-)  Dysphagia (-)  Swelling (-)    IOE: Permanent dentition. #13 with temporary filling intact.  Hard/Soft palate (WNL)  Tongue/Floor of Mouth (WNL)  Buccal Mucosa (WNL)  Percussion (+) #13  Palpation (+) #13  Mobility (-)   Swelling (-)    Radiographs: PA   Permanent dentition. #13 with temporary filling intact. Temporary restoration is possibly impinging on the pulp. (-) pathology    Assessment:  #13 with temporary filling intact. Temporary restoration is possibly impinging on the pulp. Irreversible pulpitis.    Treatment: Discussed clinical and radiographic findings with patient. No treatment indiciated at this time due to no associated facial or gingival swelling, abscess present, or fistula present. Recommended patient be referred to either outpatient private dentist or [facility] adult dental for comprehensive dental care. RX amoxacillin All questions answered.     Recommendations:   1. OTC pain medications as needed.  2.  RX amoxacillin  3. Seek comprehensive dental care with outpatient private dentist or [facility] adult dental clinic [phone].  4. If any difficulty breathing/swallowing or fever and swelling occur, return to ED.    Nghia Muñoz DDS #[id number]

## 2022-05-30 NOTE — ED PROVIDER NOTE - NSFOLLOWUPINSTRUCTIONS_ED_ALL_ED_FT
You may take Tylenol 1000 mg every 8 hours (no more than 4000 mg per day) for pain.     Please follow up with the dental clinic within the next 1-3 days to monitor your symptoms and for further evaluation.     Please come back to the Emergency Room if your symptoms get worse or if you start developing fever, swelling, sign of infection. You may take Tylenol 1000 mg every 8 hours (no more than 4000 mg per day) for pain.     Please follow up with your dentist or the dental clinic within the next 1-3 days to monitor your symptoms and for further evaluation.     Please come back to the Emergency Room if your symptoms get worse or if you start developing fever, swelling, sign of infection, trouble swallowing or speaking or any other new or concerning symptoms.

## 2022-05-30 NOTE — ED PROVIDER NOTE - CLINICAL SUMMARY MEDICAL DECISION MAKING FREE TEXT BOX
26 y F, hx of IBS, presenting with 2-days onset of L upper dental pain. Patient reports that she had a temporary filling placed two weeks ago by Dr. Garrison Weir. Ate some carrots two days ago and felt like the filling is now pressing against the nerve. Will manage pain. Dental consult and reassess.

## 2022-05-30 NOTE — ED PROVIDER NOTE - ATTENDING CONTRIBUTION TO CARE
I performed a face-to-face evaluation of the patient and performed a history and physical examination along with the resident or ACP, and/or medical student above.  I agree with the history and physical examination as documented by the resident or ACP, and/or medical student above.  Heidy:  25yo F, no sig pmh, p/w L upper molar pain after having temporary filling placed 2 wks ago by her dentist. States she ate carrots 2 days ago and felt as if carrot pushed filling into nerve. No obvious signs of dental abscess/infxn. Pain mngmt, dental consult.

## 2022-05-30 NOTE — ED PROVIDER NOTE - PHYSICAL EXAMINATION
Gen: Patient is well-appearing, NAD, able to ambulate without assistance  HEENT: NCAT, normal conjunctiva, tongue midline, oral mucosa moist, no swelling/sign of infection noted around the affected tooth #12, filling in place   Neuro: No focal sensory or motor deficits  Skin: Warm, well perfused  Psych: normal affect, calm

## 2022-05-30 NOTE — ED POST DISCHARGE NOTE - REASON FOR FOLLOW-UP
Patient called [facility] pharmacy closed asked that we send RX's to [facility]. Sent to Patient's preferred pharmacy. Other

## 2022-07-05 NOTE — ED PROVIDER NOTE - COVID-19 ORDERING FACILITY
----- Message from Eli Garcia sent at 7/5/2022  1:12 PM CDT -----  Contact: pt at [phone]  Type: Needs Medical Advice  Who Called:  pt    Best Call Back Number: [phone]    Additional Information: pt is calling the office to confirm her appt on 7/19/22. Please call back and advise.         SIMIN/OSVALDO/Elton

## 2022-08-05 NOTE — ED PROVIDER NOTE - NS ED MD DISPO DISCHARGE CCDA
Patient/Caregiver provided printed discharge information. Bilobed Transposition Flap Text: The defect edges were debeveled with a #15c scalpel blade.  Given the location of the defect and the proximity to free margins a bilobed transposition flap was deemed most appropriate.  Using a sterile surgical marker, an appropriate bilobe flap drawn around the defect.    The area thus outlined was incised deep to adipose tissue with a #15 scalpel blade.  The skin margins were undermined to an appropriate distance in all directions utilizing iris scissors.

## 2022-10-26 ENCOUNTER — EMERGENCY (EMERGENCY)
Facility: HOSPITAL | Age: 27
LOS: 1 days | Discharge: HOME | End: 2022-10-26
Attending: EMERGENCY MEDICINE | Admitting: EMERGENCY MEDICINE

## 2022-10-26 VITALS
HEART RATE: 69 BPM | RESPIRATION RATE: 20 BRPM | HEIGHT: 65 IN | TEMPERATURE: 98 F | DIASTOLIC BLOOD PRESSURE: 74 MMHG | OXYGEN SATURATION: 99 % | SYSTOLIC BLOOD PRESSURE: 116 MMHG

## 2022-10-26 VITALS
OXYGEN SATURATION: 100 % | HEART RATE: 64 BPM | RESPIRATION RATE: 18 BRPM | SYSTOLIC BLOOD PRESSURE: 115 MMHG | DIASTOLIC BLOOD PRESSURE: 52 MMHG

## 2022-10-26 DIAGNOSIS — Z98.89 OTHER SPECIFIED POSTPROCEDURAL STATES: Chronic | ICD-10-CM

## 2022-10-26 DIAGNOSIS — M75.100 UNSPECIFIED ROTATOR CUFF TEAR OR RUPTURE OF UNSPECIFIED SHOULDER, NOT SPECIFIED AS TRAUMATIC: Chronic | ICD-10-CM

## 2022-10-26 LAB
ALBUMIN SERPL ELPH-MCNC: 4.6 G/DL — SIGNIFICANT CHANGE UP (ref 3.3–5)
ALP SERPL-CCNC: 45 U/L — SIGNIFICANT CHANGE UP (ref 40–120)
ALT FLD-CCNC: 9 U/L — SIGNIFICANT CHANGE UP (ref 4–33)
ANION GAP SERPL CALC-SCNC: 14 MMOL/L — SIGNIFICANT CHANGE UP (ref 7–14)
AST SERPL-CCNC: 17 U/L — SIGNIFICANT CHANGE UP (ref 4–32)
B PERT DNA SPEC QL NAA+PROBE: SIGNIFICANT CHANGE UP
B PERT+PARAPERT DNA PNL SPEC NAA+PROBE: SIGNIFICANT CHANGE UP
BASOPHILS # BLD AUTO: 0.01 K/UL — SIGNIFICANT CHANGE UP (ref 0–0.2)
BASOPHILS NFR BLD AUTO: 0.2 % — SIGNIFICANT CHANGE UP (ref 0–2)
BILIRUB SERPL-MCNC: 0.9 MG/DL — SIGNIFICANT CHANGE UP (ref 0.2–1.2)
BORDETELLA PARAPERTUSSIS (RAPRVP): SIGNIFICANT CHANGE UP
BUN SERPL-MCNC: 9 MG/DL — SIGNIFICANT CHANGE UP (ref 7–23)
C PNEUM DNA SPEC QL NAA+PROBE: SIGNIFICANT CHANGE UP
CALCIUM SERPL-MCNC: 9.3 MG/DL — SIGNIFICANT CHANGE UP (ref 8.4–10.5)
CHLORIDE SERPL-SCNC: 101 MMOL/L — SIGNIFICANT CHANGE UP (ref 98–107)
CO2 SERPL-SCNC: 23 MMOL/L — SIGNIFICANT CHANGE UP (ref 22–31)
CREAT SERPL-MCNC: 0.64 MG/DL — SIGNIFICANT CHANGE UP (ref 0.5–1.3)
EGFR: 124 ML/MIN/1.73M2 — SIGNIFICANT CHANGE UP
EOSINOPHIL # BLD AUTO: 0.04 K/UL — SIGNIFICANT CHANGE UP (ref 0–0.5)
EOSINOPHIL NFR BLD AUTO: 0.9 % — SIGNIFICANT CHANGE UP (ref 0–6)
FLUAV SUBTYP SPEC NAA+PROBE: SIGNIFICANT CHANGE UP
FLUBV RNA SPEC QL NAA+PROBE: SIGNIFICANT CHANGE UP
GLUCOSE SERPL-MCNC: 99 MG/DL — SIGNIFICANT CHANGE UP (ref 70–99)
HADV DNA SPEC QL NAA+PROBE: SIGNIFICANT CHANGE UP
HCOV 229E RNA SPEC QL NAA+PROBE: SIGNIFICANT CHANGE UP
HCOV HKU1 RNA SPEC QL NAA+PROBE: SIGNIFICANT CHANGE UP
HCOV NL63 RNA SPEC QL NAA+PROBE: SIGNIFICANT CHANGE UP
HCOV OC43 RNA SPEC QL NAA+PROBE: SIGNIFICANT CHANGE UP
HCT VFR BLD CALC: 38.4 % — SIGNIFICANT CHANGE UP (ref 34.5–45)
HGB BLD-MCNC: 12.9 G/DL — SIGNIFICANT CHANGE UP (ref 11.5–15.5)
HMPV RNA SPEC QL NAA+PROBE: SIGNIFICANT CHANGE UP
HPIV1 RNA SPEC QL NAA+PROBE: SIGNIFICANT CHANGE UP
HPIV2 RNA SPEC QL NAA+PROBE: SIGNIFICANT CHANGE UP
HPIV3 RNA SPEC QL NAA+PROBE: SIGNIFICANT CHANGE UP
HPIV4 RNA SPEC QL NAA+PROBE: SIGNIFICANT CHANGE UP
IANC: 3 K/UL — SIGNIFICANT CHANGE UP (ref 1.8–7.4)
IMM GRANULOCYTES NFR BLD AUTO: 0.2 % — SIGNIFICANT CHANGE UP (ref 0–0.9)
LYMPHOCYTES # BLD AUTO: 1.22 K/UL — SIGNIFICANT CHANGE UP (ref 1–3.3)
LYMPHOCYTES # BLD AUTO: 26.6 % — SIGNIFICANT CHANGE UP (ref 13–44)
M PNEUMO DNA SPEC QL NAA+PROBE: SIGNIFICANT CHANGE UP
MAGNESIUM SERPL-MCNC: 1.7 MG/DL — SIGNIFICANT CHANGE UP (ref 1.6–2.6)
MCHC RBC-ENTMCNC: 32.3 PG — SIGNIFICANT CHANGE UP (ref 27–34)
MCHC RBC-ENTMCNC: 33.6 GM/DL — SIGNIFICANT CHANGE UP (ref 32–36)
MCV RBC AUTO: 96 FL — SIGNIFICANT CHANGE UP (ref 80–100)
MONOCYTES # BLD AUTO: 0.3 K/UL — SIGNIFICANT CHANGE UP (ref 0–0.9)
MONOCYTES NFR BLD AUTO: 6.6 % — SIGNIFICANT CHANGE UP (ref 2–14)
NEUTROPHILS # BLD AUTO: 3 K/UL — SIGNIFICANT CHANGE UP (ref 1.8–7.4)
NEUTROPHILS NFR BLD AUTO: 65.5 % — SIGNIFICANT CHANGE UP (ref 43–77)
NRBC # BLD: 0 /100 WBCS — SIGNIFICANT CHANGE UP (ref 0–0)
NRBC # FLD: 0 K/UL — SIGNIFICANT CHANGE UP (ref 0–0)
PLATELET # BLD AUTO: 237 K/UL — SIGNIFICANT CHANGE UP (ref 150–400)
POTASSIUM SERPL-MCNC: 3.6 MMOL/L — SIGNIFICANT CHANGE UP (ref 3.5–5.3)
POTASSIUM SERPL-SCNC: 3.6 MMOL/L — SIGNIFICANT CHANGE UP (ref 3.5–5.3)
PROT SERPL-MCNC: 7.8 G/DL — SIGNIFICANT CHANGE UP (ref 6–8.3)
RAPID RVP RESULT: SIGNIFICANT CHANGE UP
RBC # BLD: 4 M/UL — SIGNIFICANT CHANGE UP (ref 3.8–5.2)
RBC # FLD: 12.8 % — SIGNIFICANT CHANGE UP (ref 10.3–14.5)
RSV RNA SPEC QL NAA+PROBE: SIGNIFICANT CHANGE UP
RV+EV RNA SPEC QL NAA+PROBE: SIGNIFICANT CHANGE UP
SARS-COV-2 RNA SPEC QL NAA+PROBE: SIGNIFICANT CHANGE UP
SODIUM SERPL-SCNC: 138 MMOL/L — SIGNIFICANT CHANGE UP (ref 135–145)
WBC # BLD: 4.58 K/UL — SIGNIFICANT CHANGE UP (ref 3.8–10.5)
WBC # FLD AUTO: 4.58 K/UL — SIGNIFICANT CHANGE UP (ref 3.8–10.5)

## 2022-10-26 PROCEDURE — 99284 EMERGENCY DEPT VISIT MOD MDM: CPT

## 2022-10-26 RX ORDER — ONDANSETRON 8 MG/1
4 TABLET, FILM COATED ORAL ONCE
Refills: 0 | Status: COMPLETED | OUTPATIENT
Start: 2022-10-26 | End: 2022-10-26

## 2022-10-26 RX ORDER — SODIUM CHLORIDE 9 MG/ML
1000 INJECTION, SOLUTION INTRAVENOUS ONCE
Refills: 0 | Status: COMPLETED | OUTPATIENT
Start: 2022-10-26 | End: 2022-10-26

## 2022-10-26 RX ORDER — FAMOTIDINE 10 MG/ML
20 INJECTION INTRAVENOUS ONCE
Refills: 0 | Status: COMPLETED | OUTPATIENT
Start: 2022-10-26 | End: 2022-10-26

## 2022-10-26 RX ORDER — METOCLOPRAMIDE HCL 10 MG
10 TABLET ORAL ONCE
Refills: 0 | Status: COMPLETED | OUTPATIENT
Start: 2022-10-26 | End: 2022-10-26

## 2022-10-26 RX ADMIN — Medication 10 MILLIGRAM(S): at 15:27

## 2022-10-26 RX ADMIN — SODIUM CHLORIDE 1000 MILLILITER(S): 9 INJECTION, SOLUTION INTRAVENOUS at 13:14

## 2022-10-26 RX ADMIN — FAMOTIDINE 20 MILLIGRAM(S): 10 INJECTION INTRAVENOUS at 13:13

## 2022-10-26 RX ADMIN — ONDANSETRON 4 MILLIGRAM(S): 8 TABLET, FILM COATED ORAL at 13:14

## 2022-10-26 RX ADMIN — Medication 20 MILLIGRAM(S): at 15:41

## 2022-10-26 NOTE — ED ADULT NURSE NOTE - OBJECTIVE STATEMENT
Received pt in spot intake 9. AA0X3. C/o left upper quadrant pain x 4 days with N/V/D. Also endorses recent weight loss. Pt states she is feeling dehydrated and is unable to tolerate food or water orally. Pt with history of IBS and has GI follow up appt tomorrow. 20G placed to left AC, labs drawn and sent. Will monitor.

## 2022-10-26 NOTE — ED PROVIDER NOTE - NSFOLLOWUPINSTRUCTIONS_ED_ALL_ED_FT
- Please follow up with your Primary Care Doctor within 1 week. Bring your results from today.    - Follow up with your Gastroenterologist at your appointment tomorrow.     - Tylenol up to 650 mg every 6 hours as needed for pain.    - You may try over the counter Pepcid Complete --- read package for dosing instructions.    - You may try over the counter Maalox --- read package for dosing.     - Eat smaller meals. Try not to lay down flat until over 30 minutes after a meal.     - Be sure to return to the ED if you develop new, worsening, or any distressing symptoms.

## 2022-10-26 NOTE — ED PROVIDER NOTE - OBJECTIVE STATEMENT
Jaclyn: History of irritable bowel syndrome and marijuana use. Presents with abdominal pain and vomiting for three days. No F/D/trauma. Pain is throughout abd. Jaclyn: History of irritable bowel syndrome and marijuana use. Presents with abdominal pain and vomiting for three days. No F/D/trauma. Pain is throughout abd.    Ruchi Med Tox Fellow: Pt with hx IBS, states she used to smoke marijuana to help with sxs of IBS, stopped recently, presents to the ED with abd discomfort and the feeling of being unable to keep food down/dry heaving for the past 3 days. Reports feeling weak because of this. Also reports nb diarrhea. Denies any fevers/chills, uri sxs, cough, chest pain, sob, urinary sxs.

## 2022-10-26 NOTE — ED ADULT TRIAGE NOTE - CHIEF COMPLAINT QUOTE
Pt has a hx IBS. States that she is having a flare up. Unable to tolerate anything PO. Feels dehydrated and weak.

## 2022-10-26 NOTE — ED PROVIDER NOTE - ATTENDING CONTRIBUTION TO CARE
I performed a face-to-face evaluation of the patient and performed a history and physical examination. I agree with the history and physical examination. If this was a PA visit, I personally saw the patient with the PA and performed a substantive portion of the visit including all aspects of the medical decision making.    Jaclyn: History of irritable bowel syndrome and marijuana use. Presents with abdominal pain and vomiting for three days. No F/D/trauma. Pain is throughout abd. Appears dehydrated. Will give IV fluids. And N/V medications. Check electrolytes. Reassess.

## 2022-10-26 NOTE — ED PROVIDER NOTE - PHYSICAL EXAMINATION
GENERAL: no acute distress, non-toxic appearing  HEENT: normal conjunctiva, oral mucosa moist  CARDIAC: regular rate and regular rhythm, bp reassuring  PULM: clear to ascultation bilaterally, no incr wob  GI: abdomen nondistended, soft, nontender though with epigastric discomfort  : no CVA tenderness, no suprapubic tenderness  NEURO: alert and oriented x 3, normal speech, moving all extremities without lateralization  MSK: no visible deformities, no peripheral edema  SKIN: no visible rashes  PSYCH: pt anxious appearing

## 2022-10-26 NOTE — ED PROVIDER NOTE - CLINICAL SUMMARY MEDICAL DECISION MAKING FREE TEXT BOX
Jaclyn: History of irritable bowel syndrome and marijuana use. Presents with abdominal pain and vomiting for three days. No F/D/trauma. Pain is throughout abd. Appears dehydrated. Will give IV fluids. And N/V medications. Check electrolytes. Reassess.

## 2022-10-26 NOTE — ED PROVIDER NOTE - PATIENT PORTAL LINK FT
You can access the FollowMyHealth Patient Portal offered by [facility] by registering at the following website: [email or web address]. By joining [employer]’s FollowMyHealth portal, you will also be able to view your health information using other applications (apps) compatible with our system.

## 2022-10-26 NOTE — ED PROVIDER NOTE - PROGRESS NOTE DETAILS
Ruchi Med Tox Fellow: labs reassuring, pt received fluids/meds and still states she doesn't feel like she can tolerate PO  Attempting Reglan then will po challenge  Pt has appt with GI doctor tomorrow Ruchi, Med Tox Fellow: pt feels better after meds, states she wants to leave, tolerating PO ginger ale, has appt with GI tomorrow. SAfe for discharge

## 2022-10-28 ENCOUNTER — NON-APPOINTMENT (OUTPATIENT)
Age: 27
End: 2022-10-28

## 2022-10-29 ENCOUNTER — EMERGENCY (EMERGENCY)
Facility: HOSPITAL | Age: 27
LOS: 1 days | Discharge: LEFT BEFORE DISCHARGE | End: 2022-10-29
Admitting: EMERGENCY MEDICINE

## 2022-10-29 VITALS
DIASTOLIC BLOOD PRESSURE: 81 MMHG | SYSTOLIC BLOOD PRESSURE: 117 MMHG | OXYGEN SATURATION: 100 % | HEART RATE: 86 BPM | RESPIRATION RATE: 16 BRPM | TEMPERATURE: 99 F | HEIGHT: 65 IN

## 2022-10-29 DIAGNOSIS — M75.100 UNSPECIFIED ROTATOR CUFF TEAR OR RUPTURE OF UNSPECIFIED SHOULDER, NOT SPECIFIED AS TRAUMATIC: Chronic | ICD-10-CM

## 2022-10-29 DIAGNOSIS — Z98.89 OTHER SPECIFIED POSTPROCEDURAL STATES: Chronic | ICD-10-CM

## 2022-10-29 PROCEDURE — L9991: CPT

## 2022-10-29 NOTE — ED ADULT TRIAGE NOTE - WILL THE PATIENT ACCEPT THE PFIZER COVID-19 VACCINE IF ELIGIBLE AND IT IS AVAILABLE?
Plan of Care Note     Reason for Admission: Large bowel obstruction  Procedures: 03/26/22: exploratory laparotomy, small bowel resection, colostomy creation (N/A Abdomen); Sigmoidoscopy flexible   IV Access/Incisions/Drains/Wounds:   Peripheral IV 03/26/22 Right Lower forearm (Active)   Site Assessment [facility] 03/31/22 2200   Line Status Saline locked 03/31/22 2200   Phlebitis Scale 0-->no symptoms 03/31/22 2200   Infiltration Scale 0 03/31/22 2200   Infiltration Site Treatment Method  None 03/31/22 0100   Number of days: 6       PICC Double Lumen 03/28/22 Left Brachial vein medial (Active)   Site Assessment [facility] 03/31/22 2200   External Cath Length (cm) 2 cm 03/28/22 1242   Extremity Circumference (cm) 22 cm 03/28/22 1242   Dressing Intervention Chlorhexidine patch;Transparent 03/31/22 2200   Dressing Change Due 04/04/22 03/29/22 0900   Millard - Cap Change Due 03/30/22 03/30/22 0100   Purple - Status infusing 03/31/22 2200   Purple - Cap Change Due 03/30/22 03/30/22 0100   Red - Status heparin locked 03/31/22 2200   PICC Comment PICC ok to use 03/28/22 1242   Extravasation? No 03/31/22 2200   Line Necessity Yes, meets criteria 03/31/22 2200   Number of days: 4       Closed/Suction Drain 1 Right RLQ Bulb 19 [place] (Active)   Site Description WD 03/31/22 2100   Dressing Status Other (comment) 03/31/22 2100   Drainage Appearance Serosanguenous 03/31/22 2100   Status To bulb suction 03/31/22 2100   Output (ml) 10 ml 03/31/22 2100   Number of days: 5       Closed/Suction Drain 2 Right RLQ Bulb 19 [place] (Active)   Site Description WD 03/31/22 2100   Dressing Status Other (comment) 03/31/22 2100   Drainage Appearance Serosanguenous 03/31/22 2100   Status To bulb suction 03/31/22 2100   Output (ml) 10 ml 03/31/22 2100   Number of days: 5       Colostomy (Active)   Stomal Appliance Intact 03/31/22 2100   Stoma Assessment Red 03/31/22 2100   Peristomal Assessment UTV 03/31/22 2100   Stool Amount Other (Comment) 03/31/22 2100  "  Output (ml) 0 ml 03/31/22 2100   Number of days: 5       Incision/Surgical Site 03/26/22 Abdomen (Active)   Incision Assessment WDL 03/31/22 2130   Akiko-Incision Assessment Warm 03/31/22 2130   Closure Staples 03/31/22 2130   Incision Drainage Amount None 03/31/22 2130   Drainage Description Serosanguinous 03/31/22 2130   Dressing Intervention Open to air / No Dressing 03/31/22 2130   Number of days: 6   IVF: TPN @ 60 ml/hr and Lipids @ 20.8 ml/hr  VS: BP (!) 118/92 (BP Location: Right arm)   Pulse 57   Temp 98.3  F (36.8  C) (Temporal)   Resp 16   Ht 1.778 m (5' 10\")   Wt 55.4 kg (122 lb 3.2 oz)   SpO2 98%   BMI 17.53 kg/m     Diet: parenteral nutrition - ADULT compounded formula  NPO for Medical/Clinical Reasons Except for: Meds, Ice Chips    Activity: SBA  Pain Management: Oxycodone  GI/: Voiding spontaneously, -BM (via Colostomy), +Flatus (via Colostomy), -Nausea  Neuro: A&Ox4  Team: Colorectal  Pertinent Labs: None        Shift Summary  Patient slept intermittently on shift. Patient up for a walk early on shift. Plan for patient to discharge home once he has ROBF. Continue POC.             " No

## 2022-12-04 ENCOUNTER — NON-APPOINTMENT (OUTPATIENT)
Age: 27
End: 2022-12-04

## 2022-12-05 NOTE — ED PROVIDER NOTE - CONSTITUTIONAL NEGATIVE STATEMENT, MLM
no fever and no chills. Libtayo Counseling- I discussed with the patient the risks of Libtayo including but not limited to nausea, vomiting, diarrhea, and bone or muscle pain.  The patient verbalized understanding of the proper use and possible adverse effects of Libtayo.  All of the patient's questions and concerns were addressed.

## 2023-01-11 NOTE — ED PROVIDER NOTE - CROS ED ENMT ALL NEG
Mallampati: Class III - soft palate, base of uvula visible. ASA: Class 3 - patient with severe systemic disease. - - -

## 2023-01-25 NOTE — ED PROVIDER NOTE - PATIENT PORTAL LINK FT
No You can access the FollowMyHealth Patient Portal offered by [facility] by registering at the following website: [email or web address]. By joining [employer]’s FollowMyHealth portal, you will also be able to view your health information using other applications (apps) compatible with our system.

## 2023-02-07 NOTE — ED ADULT TRIAGE NOTE - PAIN RATING/NUMBER SCALE (0-10): REST
0
GENERAL: NAD, appears stated age  HEENT: anicteric, eomi  CHEST/LUNG: CTA b/l, no rales, wheezes, or rhonchi  HEART: RRR, S1, S2  ABDOMEN: BS+, soft, nontender, nondistended  EXTREMITIES: trace LLE edema, cyanosis, or calf tenderness  NERVOUS SYSTEM: answers questions and follows commands appropriately

## 2023-06-04 ENCOUNTER — EMERGENCY (EMERGENCY)
Facility: HOSPITAL | Age: 28
LOS: 1 days | Discharge: HOME | End: 2023-06-04
Attending: EMERGENCY MEDICINE
Payer: COMMERCIAL

## 2023-06-04 VITALS
DIASTOLIC BLOOD PRESSURE: 100 MMHG | SYSTOLIC BLOOD PRESSURE: 136 MMHG | HEART RATE: 89 BPM | OXYGEN SATURATION: 97 % | TEMPERATURE: 98 F | WEIGHT: 126.1 LBS | HEIGHT: 66 IN | RESPIRATION RATE: 20 BRPM

## 2023-06-04 DIAGNOSIS — Z98.89 OTHER SPECIFIED POSTPROCEDURAL STATES: Chronic | ICD-10-CM

## 2023-06-04 DIAGNOSIS — M75.100 UNSPECIFIED ROTATOR CUFF TEAR OR RUPTURE OF UNSPECIFIED SHOULDER, NOT SPECIFIED AS TRAUMATIC: Chronic | ICD-10-CM

## 2023-06-04 LAB
ALBUMIN SERPL ELPH-MCNC: 4.1 G/DL — SIGNIFICANT CHANGE UP (ref 3.3–5)
ALP SERPL-CCNC: 42 U/L — SIGNIFICANT CHANGE UP (ref 40–120)
ALT FLD-CCNC: 15 U/L — SIGNIFICANT CHANGE UP (ref 10–45)
ANION GAP SERPL CALC-SCNC: 16 MMOL/L — SIGNIFICANT CHANGE UP (ref 5–17)
AST SERPL-CCNC: 24 U/L — SIGNIFICANT CHANGE UP (ref 10–40)
BASE EXCESS BLDV CALC-SCNC: 1.9 MMOL/L — SIGNIFICANT CHANGE UP (ref -2–3)
BASOPHILS # BLD AUTO: 0.03 K/UL — SIGNIFICANT CHANGE UP (ref 0–0.2)
BASOPHILS NFR BLD AUTO: 0.9 % — SIGNIFICANT CHANGE UP (ref 0–2)
BILIRUB SERPL-MCNC: 0.6 MG/DL — SIGNIFICANT CHANGE UP (ref 0.2–1.2)
BUN SERPL-MCNC: 5 MG/DL — LOW (ref 7–23)
CA-I SERPL-SCNC: 1.13 MMOL/L — LOW (ref 1.15–1.33)
CALCIUM SERPL-MCNC: 9 MG/DL — SIGNIFICANT CHANGE UP (ref 8.4–10.5)
CHLORIDE BLDV-SCNC: 101 MMOL/L — SIGNIFICANT CHANGE UP (ref 96–108)
CHLORIDE SERPL-SCNC: 101 MMOL/L — SIGNIFICANT CHANGE UP (ref 96–108)
CO2 BLDV-SCNC: 27 MMOL/L — HIGH (ref 22–26)
CO2 SERPL-SCNC: 21 MMOL/L — LOW (ref 22–31)
CREAT SERPL-MCNC: 0.67 MG/DL — SIGNIFICANT CHANGE UP (ref 0.5–1.3)
EGFR: 122 ML/MIN/1.73M2 — SIGNIFICANT CHANGE UP
EOSINOPHIL # BLD AUTO: 0 K/UL — SIGNIFICANT CHANGE UP (ref 0–0.5)
EOSINOPHIL NFR BLD AUTO: 0 % — SIGNIFICANT CHANGE UP (ref 0–6)
GAS PNL BLDV: 136 MMOL/L — SIGNIFICANT CHANGE UP (ref 136–145)
GAS PNL BLDV: SIGNIFICANT CHANGE UP
GAS PNL BLDV: SIGNIFICANT CHANGE UP
GLUCOSE BLDV-MCNC: 92 MG/DL — SIGNIFICANT CHANGE UP (ref 70–99)
GLUCOSE SERPL-MCNC: 97 MG/DL — SIGNIFICANT CHANGE UP (ref 70–99)
HCG SERPL-ACNC: <2 MIU/ML — SIGNIFICANT CHANGE UP
HCO3 BLDV-SCNC: 26 MMOL/L — SIGNIFICANT CHANGE UP (ref 22–29)
HCOV PNL SPEC NAA+PROBE: DETECTED
HCT VFR BLD CALC: 35.6 % — SIGNIFICANT CHANGE UP (ref 34.5–45)
HCT VFR BLDA CALC: 39 % — SIGNIFICANT CHANGE UP (ref 34.5–46.5)
HGB BLD CALC-MCNC: 13 G/DL — SIGNIFICANT CHANGE UP (ref 11.7–16.1)
HGB BLD-MCNC: 12.1 G/DL — SIGNIFICANT CHANGE UP (ref 11.5–15.5)
LACTATE BLDV-MCNC: 2.1 MMOL/L — HIGH (ref 0.5–2)
LYMPHOCYTES # BLD AUTO: 0.72 K/UL — LOW (ref 1–3.3)
LYMPHOCYTES # BLD AUTO: 21.9 % — SIGNIFICANT CHANGE UP (ref 13–44)
MANUAL SMEAR VERIFICATION: SIGNIFICANT CHANGE UP
MCHC RBC-ENTMCNC: 31.9 PG — SIGNIFICANT CHANGE UP (ref 27–34)
MCHC RBC-ENTMCNC: 34 GM/DL — SIGNIFICANT CHANGE UP (ref 32–36)
MCV RBC AUTO: 93.9 FL — SIGNIFICANT CHANGE UP (ref 80–100)
MONOCYTES # BLD AUTO: 0.26 K/UL — SIGNIFICANT CHANGE UP (ref 0–0.9)
MONOCYTES NFR BLD AUTO: 7.9 % — SIGNIFICANT CHANGE UP (ref 2–14)
NEUTROPHILS # BLD AUTO: 2.27 K/UL — SIGNIFICANT CHANGE UP (ref 1.8–7.4)
NEUTROPHILS NFR BLD AUTO: 66.7 % — SIGNIFICANT CHANGE UP (ref 43–77)
NEUTS BAND # BLD: 2.6 % — SIGNIFICANT CHANGE UP (ref 0–8)
PCO2 BLDV: 36 MMHG — LOW (ref 39–42)
PH BLDV: 7.46 — HIGH (ref 7.32–7.43)
PLAT MORPH BLD: NORMAL — SIGNIFICANT CHANGE UP
PLATELET # BLD AUTO: 170 K/UL — SIGNIFICANT CHANGE UP (ref 150–400)
PO2 BLDV: 23 MMHG — LOW (ref 25–45)
POTASSIUM BLDV-SCNC: 2.7 MMOL/L — CRITICAL LOW (ref 3.5–5.1)
POTASSIUM SERPL-MCNC: 3 MMOL/L — LOW (ref 3.5–5.3)
POTASSIUM SERPL-SCNC: 3 MMOL/L — LOW (ref 3.5–5.3)
PROT SERPL-MCNC: 7.3 G/DL — SIGNIFICANT CHANGE UP (ref 6–8.3)
RAPID RVP RESULT: DETECTED
RBC # BLD: 3.79 M/UL — LOW (ref 3.8–5.2)
RBC # FLD: 13.1 % — SIGNIFICANT CHANGE UP (ref 10.3–14.5)
RBC BLD AUTO: SIGNIFICANT CHANGE UP
SAO2 % BLDV: 34 % — LOW (ref 67–88)
SARS-COV-2 RNA SPEC QL NAA+PROBE: SIGNIFICANT CHANGE UP
SODIUM SERPL-SCNC: 138 MMOL/L — SIGNIFICANT CHANGE UP (ref 135–145)
TROPONIN T, HIGH SENSITIVITY RESULT: <6 NG/L — SIGNIFICANT CHANGE UP (ref 0–51)
WBC # BLD: 3.27 K/UL — LOW (ref 3.8–10.5)
WBC # FLD AUTO: 3.27 K/UL — LOW (ref 3.8–10.5)

## 2023-06-04 PROCEDURE — 71045 X-RAY EXAM CHEST 1 VIEW: CPT | Mod: 26

## 2023-06-04 PROCEDURE — 96365 THER/PROPH/DIAG IV INF INIT: CPT

## 2023-06-04 PROCEDURE — 84295 ASSAY OF SERUM SODIUM: CPT

## 2023-06-04 PROCEDURE — 94640 AIRWAY INHALATION TREATMENT: CPT

## 2023-06-04 PROCEDURE — 82803 BLOOD GASES ANY COMBINATION: CPT

## 2023-06-04 PROCEDURE — 82435 ASSAY OF BLOOD CHLORIDE: CPT

## 2023-06-04 PROCEDURE — 85014 HEMATOCRIT: CPT

## 2023-06-04 PROCEDURE — 85018 HEMOGLOBIN: CPT

## 2023-06-04 PROCEDURE — 83605 ASSAY OF LACTIC ACID: CPT

## 2023-06-04 PROCEDURE — 85025 COMPLETE CBC W/AUTO DIFF WBC: CPT

## 2023-06-04 PROCEDURE — 99285 EMERGENCY DEPT VISIT HI MDM: CPT | Mod: 25

## 2023-06-04 PROCEDURE — 84484 ASSAY OF TROPONIN QUANT: CPT

## 2023-06-04 PROCEDURE — 82947 ASSAY GLUCOSE BLOOD QUANT: CPT

## 2023-06-04 PROCEDURE — 82330 ASSAY OF CALCIUM: CPT

## 2023-06-04 PROCEDURE — 84132 ASSAY OF SERUM POTASSIUM: CPT

## 2023-06-04 PROCEDURE — 84702 CHORIONIC GONADOTROPIN TEST: CPT

## 2023-06-04 PROCEDURE — 96376 TX/PRO/DX INJ SAME DRUG ADON: CPT

## 2023-06-04 PROCEDURE — 0225U NFCT DS DNA&RNA 21 SARSCOV2: CPT

## 2023-06-04 PROCEDURE — 80053 COMPREHEN METABOLIC PANEL: CPT

## 2023-06-04 PROCEDURE — 85379 FIBRIN DEGRADATION QUANT: CPT

## 2023-06-04 PROCEDURE — 36415 COLL VENOUS BLD VENIPUNCTURE: CPT

## 2023-06-04 PROCEDURE — 96375 TX/PRO/DX INJ NEW DRUG ADDON: CPT

## 2023-06-04 PROCEDURE — 71045 X-RAY EXAM CHEST 1 VIEW: CPT

## 2023-06-04 PROCEDURE — 99285 EMERGENCY DEPT VISIT HI MDM: CPT

## 2023-06-04 RX ORDER — IPRATROPIUM/ALBUTEROL SULFATE 18-103MCG
3 AEROSOL WITH ADAPTER (GRAM) INHALATION
Refills: 0 | Status: COMPLETED | OUTPATIENT
Start: 2023-06-04 | End: 2023-06-04

## 2023-06-04 RX ORDER — MAGNESIUM SULFATE 500 MG/ML
2 VIAL (ML) INJECTION ONCE
Refills: 0 | Status: COMPLETED | OUTPATIENT
Start: 2023-06-04 | End: 2023-06-04

## 2023-06-04 RX ORDER — ONDANSETRON 8 MG/1
4 TABLET, FILM COATED ORAL ONCE
Refills: 0 | Status: COMPLETED | OUTPATIENT
Start: 2023-06-04 | End: 2023-06-04

## 2023-06-04 RX ORDER — FAMOTIDINE 10 MG/ML
20 INJECTION INTRAVENOUS ONCE
Refills: 0 | Status: COMPLETED | OUTPATIENT
Start: 2023-06-04 | End: 2023-06-04

## 2023-06-04 RX ORDER — IPRATROPIUM/ALBUTEROL SULFATE 18-103MCG
3 AEROSOL WITH ADAPTER (GRAM) INHALATION EVERY 6 HOURS
Refills: 0 | Status: DISCONTINUED | OUTPATIENT
Start: 2023-06-04 | End: 2023-06-04

## 2023-06-04 RX ORDER — POTASSIUM CHLORIDE 20 MEQ
10 PACKET (EA) ORAL
Refills: 0 | Status: COMPLETED | OUTPATIENT
Start: 2023-06-04 | End: 2023-06-04

## 2023-06-04 RX ORDER — ACETAMINOPHEN 500 MG
1000 TABLET ORAL ONCE
Refills: 0 | Status: COMPLETED | OUTPATIENT
Start: 2023-06-04 | End: 2023-06-04

## 2023-06-04 RX ADMIN — Medication 100 MILLIEQUIVALENT(S): at 23:53

## 2023-06-04 RX ADMIN — Medication 2 GRAM(S): at 22:53

## 2023-06-04 RX ADMIN — ONDANSETRON 4 MILLIGRAM(S): 8 TABLET, FILM COATED ORAL at 22:42

## 2023-06-04 RX ADMIN — Medication 100 MILLIEQUIVALENT(S): at 21:24

## 2023-06-04 RX ADMIN — Medication 3 MILLILITER(S): at 20:20

## 2023-06-04 RX ADMIN — FAMOTIDINE 20 MILLIGRAM(S): 10 INJECTION INTRAVENOUS at 22:42

## 2023-06-04 RX ADMIN — Medication 10 MILLIEQUIVALENT(S): at 22:53

## 2023-06-04 RX ADMIN — Medication 1000 MILLIGRAM(S): at 20:23

## 2023-06-04 RX ADMIN — Medication 150 GRAM(S): at 22:53

## 2023-06-04 RX ADMIN — Medication 100 MILLIEQUIVALENT(S): at 22:36

## 2023-06-04 RX ADMIN — Medication 400 MILLIGRAM(S): at 20:03

## 2023-06-04 RX ADMIN — ONDANSETRON 4 MILLIGRAM(S): 8 TABLET, FILM COATED ORAL at 20:50

## 2023-06-04 RX ADMIN — Medication 125 MILLIGRAM(S): at 22:43

## 2023-06-04 RX ADMIN — Medication 3 MILLILITER(S): at 20:03

## 2023-06-04 RX ADMIN — Medication 3 MILLILITER(S): at 19:17

## 2023-06-04 NOTE — ED PROVIDER NOTE - PROGRESS NOTE DETAILS
Noman Robertson MD: Patient improved after steroids and magnesium. She is amenable to taking short course of steroids and discharge after receiving all runs of K. Patient reassessed, feeling much better no longer having chest pain or shortness of breath.  Lung exam is now clear to auscultation.  Patient has received potassium to the IV.  Was found to have coronavirus positive.  Findings were relayed to patient, recommend follow-up with the primary care doctor for reevaluation in 1 week

## 2023-06-04 NOTE — ED PROVIDER NOTE - NSFOLLOWUPINSTRUCTIONS_ED_ALL_ED_FT
You were seen in the emergency department for chest pain. We have evaluated you and determined that you do not require further hospital interventions.    During your stay you had the following relevant results: an EKG and troponin (blood test) that do not show evidence of a heart attack, a chest X-ray that does not show evidence of a lung infection, a nose swab that shows you have coronavirus (not COVID-19).    Please follow up with a CARDIOLOGIST to discuss the results of your stay in our department.    You are being sent home with a prescription. Please take as indicated.    If you start to experience worsening symptoms such as persistent chest pain, nausea or vomiting, difficulty breathing, please return to the emergency department for further evaluation.

## 2023-06-04 NOTE — ED PROVIDER NOTE - ATTENDING CONTRIBUTION TO CARE
27-year-old female with history of asthma not consistent daily use of asthma medications and not on steroids as well as history of gastritis and endometriosis presenting with 1 to 2 days of worsening shortness of breath and chest pain.  Reports the chest pain in the upper chest and retrosternal area and has been worsening today has mild cough and some runny nose before but not currently denies fever chills has been having rigors with the onset of pain denies nausea vomiting or change in bowel habits or urinary symptoms  Well-appearing on examination no acute distress neuro grossly intact tachycardic S1-S2 mild expiratory wheezing throughout both lungs soft nontender abdomen and no CVAT or midline tenderness over the spine and no peripheral edema  Differential is broad and includes pneumonia asthma exacerbation bilateral illness versus less likely to be pericarditis or other labs chest x-ray and symptomatic treatment  Patient continues to have symptoms and had to be remedicated given persistence of symptoms sent dimer as a rule out  Dimer is negative patient has positive viral panel plan for symptom control if able to discharge

## 2023-06-04 NOTE — ED ADULT NURSE NOTE - NSFALLUNIVINTERV_ED_ALL_ED
Bed/Stretcher in lowest position, wheels locked, appropriate side rails in place/Call bell, personal items and telephone in reach/Instruct patient to call for assistance before getting out of bed/chair/stretcher/Non-slip footwear applied when patient is off stretcher/[place] to call system/Physically safe environment - no spills, clutter or unnecessary equipment/Purposeful proactive rounding/Room/bathroom lighting operational, light cord in reach

## 2023-06-04 NOTE — ED PROVIDER NOTE - CLINICAL SUMMARY MEDICAL DECISION MAKING FREE TEXT BOX
History of Present Illness: 27-year-old female with past medical history of asthma and iron deficiency anemia presents to the emergency department due to sharp midsternal chest pain as well as shortness of breath for the past day.  She states that this started while she was at rest.  She mentioned that she has an unclear history of a right bundle branch block and heart murmur but does not know the etiology that supports those findings.  She has been to a cardiologist who has performed a stress test years ago.  She does not recall ever undergoing an echocardiogram.  She denies any fevers, chills, nausea, vomiting.    Review of Systems: All other systems negative except as noted in the HPI    General: Alert, oriented to person, time, place  Psych: mood appropriate  Head: normocephalic; atraumatic  Eyes: conjunctivae clear bilaterally, sclerae anicteric  ENT: no nasal flaring, patent nares  Cardio: Regular rate and rhythm; normal heart sounds  Resp: Diffuse wheezing in all lung fields; slightly decreased air entry  GI: Abdomen soft, nontender, nondistended  Neuro: Strength 5/5 in upper and lower extremities; normal sensation  Skin: No rashes or bruising noted  MSK: Normal movement of extremities  Lymph/Vasc: No LE edema    Medical Decision Makin-year-old female here for chest pain and shortness of breath.  Based on physical exam, there is concern for asthma exacerbation.  Patient is to immediately receive at least 3 rounds of nebulized treatment.  The patient's risk factors for ACS were reviewed as well as the initial EKG.  A chest x-ray has been ordered to exclude pneumonia, pneumothorax, or esophageal tears.  The patient does not require CT angiogram to rule out a pulmonary embolism based on the Wells Score and/or PERC rule. There are no concerning features of history of exam that are strongly suggestive of pericarditis, endocarditis, or myocarditis. There is no fever.  There does not appear to be an aortic dissection either based on history or physical exam. Will obtain serial EKG's as indicated for evolving symptoms, an initial troponin, maintain on cardiac monitor, reassess.

## 2023-06-04 NOTE — ED PROVIDER NOTE - PATIENT PORTAL LINK FT
You can access the FollowMyHealth Patient Portal offered by [facility] by registering at the following website: [email or web address]. By joining [employer]’s FollowMyHealth portal, you will also be able to view your health information using other applications (apps) compatible with our system.

## 2023-06-04 NOTE — ED ADULT NURSE REASSESSMENT NOTE - NS ED NURSE REASSESS COMMENT FT1
Report received from Alicia ALDANA. Introduced self to pt and updated in plan of care- awaiting potassium infusion to finish. Heart rate and o2 saturation updated. Stretcher in lowest position and locked, appropriate side rails in place, room cleared of clutter and safety hazards, blankets given for comfort

## 2023-06-04 NOTE — ED ADULT NURSE NOTE - OBJECTIVE STATEMENT
26 y/o female presenting to the ER c/o chest pain. Pt reports chest pain x 24 hours since pt was at [facility] w/ son, pt reports having chest pain and feeling lightheaded and almost passing out, pt went home after to rest thinking chest pain would leave, pt came to ER for further eval. Pt presents to [facility] A&Ox3, pt placed on continuous cardiac monitoring and pulse oximetry, 20 G IV placed in L. AC by ER RN, pt endorsing L. sided chest pain radiating to the R. side as well as back pain acutely worsening today after starting 24 hours ago. PMHx anemia, IBS, heart murmur, asthma. Pt denies fever/chills, HA, abd pain, N/V/D, lightheadedness/dizziness, numbness/tingling. Pt A&Ox3, breathing spontaneous and unlabored, IV locked and patent, pt instructed on use of call bell, bed locked and in lowest position.

## 2023-06-05 VITALS
SYSTOLIC BLOOD PRESSURE: 114 MMHG | TEMPERATURE: 98 F | HEART RATE: 80 BPM | RESPIRATION RATE: 18 BRPM | OXYGEN SATURATION: 96 % | DIASTOLIC BLOOD PRESSURE: 79 MMHG

## 2023-07-07 NOTE — ED ADULT NURSE NOTE - IS THE PATIENT ABLE TO BE SCREENED?
Critical platlet cont of 46.      Messages Janine via TT.
Message seen by Lucia Jimenez, asked to forward to Jared Ann given she is outpt hd.    Message seen by bhavya
Per dr. Steffany Davis Could you pls reach out to the pt and tell him his platelet count is slight lower than usual and he needs to contact his hematologist dr Leena Pond at [facility]. And pls document in epic . I spoke to Jagjit Walton and he is aware plt low at 46. Advised he has a scheduled f/u on Monday with hem onc.
Richie Dudley unavailable asked to route to another provider. Sent message to  third attempt.
Yes

## 2023-07-11 ENCOUNTER — NON-APPOINTMENT (OUTPATIENT)
Age: 28
End: 2023-07-11

## 2023-10-24 NOTE — ED ADULT NURSE NOTE - PAIN: BODY LOCATION
For information on Fall & Injury Prevention, visit: [email or web address] OR  [email or web address] OR  https://www.cdc.gov/steadi/patient.html
wrist/Right:

## 2024-02-29 NOTE — ED PROVIDER NOTE - QRS
Received request via: Patient    Was the patient seen in the last year in this department? Yes    Does the patient have an active prescription (recently filled or refills available) for medication(s) requested? No    [facility]    Does the patient have shelter Plus and need 100 day supply (blood pressure, diabetes and cholesterol meds only)? Medication is not for cholesterol, blood pressure or diabetes   102 incomplete RBBB (old per patient)

## 2024-04-29 NOTE — ED ADULT NURSE NOTE - CHIEF COMPLAINT
The patient is a 25y Female complaining of 
Detail Level: Zone
Continue Regimen: econazole 1 % topical cream BID
Render In Strict Bullet Format?: No

## 2024-06-25 ENCOUNTER — NON-APPOINTMENT (OUTPATIENT)
Age: 29
End: 2024-06-25

## 2024-07-27 ENCOUNTER — EMERGENCY (EMERGENCY)
Facility: HOSPITAL | Age: 29
LOS: 1 days | Discharge: HOME | End: 2024-07-27
Attending: EMERGENCY MEDICINE
Payer: MEDICAID

## 2024-07-27 VITALS
TEMPERATURE: 99 F | SYSTOLIC BLOOD PRESSURE: 124 MMHG | HEIGHT: 66 IN | HEART RATE: 99 BPM | WEIGHT: 117.95 LBS | DIASTOLIC BLOOD PRESSURE: 84 MMHG | RESPIRATION RATE: 19 BRPM | OXYGEN SATURATION: 98 %

## 2024-07-27 VITALS
RESPIRATION RATE: 19 BRPM | DIASTOLIC BLOOD PRESSURE: 83 MMHG | OXYGEN SATURATION: 99 % | SYSTOLIC BLOOD PRESSURE: 118 MMHG | TEMPERATURE: 99 F | HEART RATE: 88 BPM

## 2024-07-27 DIAGNOSIS — Z98.89 OTHER SPECIFIED POSTPROCEDURAL STATES: Chronic | ICD-10-CM

## 2024-07-27 DIAGNOSIS — M75.100 UNSPECIFIED ROTATOR CUFF TEAR OR RUPTURE OF UNSPECIFIED SHOULDER, NOT SPECIFIED AS TRAUMATIC: Chronic | ICD-10-CM

## 2024-07-27 LAB
ALBUMIN SERPL ELPH-MCNC: 4.5 G/DL — SIGNIFICANT CHANGE UP (ref 3.3–5)
ALP SERPL-CCNC: 74 U/L — SIGNIFICANT CHANGE UP (ref 40–120)
ALT FLD-CCNC: 14 U/L — SIGNIFICANT CHANGE UP (ref 10–45)
ANION GAP SERPL CALC-SCNC: 14 MMOL/L — SIGNIFICANT CHANGE UP (ref 5–17)
APTT BLD: 31.2 SEC — SIGNIFICANT CHANGE UP (ref 24.5–35.6)
AST SERPL-CCNC: 16 U/L — SIGNIFICANT CHANGE UP (ref 10–40)
BASOPHILS # BLD AUTO: 0.02 K/UL — SIGNIFICANT CHANGE UP (ref 0–0.2)
BASOPHILS NFR BLD AUTO: 0.3 % — SIGNIFICANT CHANGE UP (ref 0–2)
BILIRUB SERPL-MCNC: 0.4 MG/DL — SIGNIFICANT CHANGE UP (ref 0.2–1.2)
BUN SERPL-MCNC: 10 MG/DL — SIGNIFICANT CHANGE UP (ref 7–23)
CALCIUM SERPL-MCNC: 9.9 MG/DL — SIGNIFICANT CHANGE UP (ref 8.4–10.5)
CHLORIDE SERPL-SCNC: 100 MMOL/L — SIGNIFICANT CHANGE UP (ref 96–108)
CO2 SERPL-SCNC: 24 MMOL/L — SIGNIFICANT CHANGE UP (ref 22–31)
CREAT SERPL-MCNC: 0.69 MG/DL — SIGNIFICANT CHANGE UP (ref 0.5–1.3)
D DIMER BLD IA.RAPID-MCNC: <150 NG/ML DDU — SIGNIFICANT CHANGE UP
EGFR: 120 ML/MIN/1.73M2 — SIGNIFICANT CHANGE UP
EOSINOPHIL # BLD AUTO: 0.11 K/UL — SIGNIFICANT CHANGE UP (ref 0–0.5)
EOSINOPHIL NFR BLD AUTO: 1.7 % — SIGNIFICANT CHANGE UP (ref 0–6)
GLUCOSE SERPL-MCNC: 76 MG/DL — SIGNIFICANT CHANGE UP (ref 70–99)
HCG SERPL-ACNC: <2 MIU/ML — SIGNIFICANT CHANGE UP
HCT VFR BLD CALC: 39.6 % — SIGNIFICANT CHANGE UP (ref 34.5–45)
HGB BLD-MCNC: 13.1 G/DL — SIGNIFICANT CHANGE UP (ref 11.5–15.5)
IMM GRANULOCYTES NFR BLD AUTO: 0.2 % — SIGNIFICANT CHANGE UP (ref 0–0.9)
INR BLD: 1.05 RATIO — SIGNIFICANT CHANGE UP (ref 0.85–1.18)
LYMPHOCYTES # BLD AUTO: 1.91 K/UL — SIGNIFICANT CHANGE UP (ref 1–3.3)
LYMPHOCYTES # BLD AUTO: 30.2 % — SIGNIFICANT CHANGE UP (ref 13–44)
MCHC RBC-ENTMCNC: 32.2 PG — SIGNIFICANT CHANGE UP (ref 27–34)
MCHC RBC-ENTMCNC: 33.1 GM/DL — SIGNIFICANT CHANGE UP (ref 32–36)
MCV RBC AUTO: 97.3 FL — SIGNIFICANT CHANGE UP (ref 80–100)
MONOCYTES # BLD AUTO: 0.55 K/UL — SIGNIFICANT CHANGE UP (ref 0–0.9)
MONOCYTES NFR BLD AUTO: 8.7 % — SIGNIFICANT CHANGE UP (ref 2–14)
NEUTROPHILS # BLD AUTO: 3.72 K/UL — SIGNIFICANT CHANGE UP (ref 1.8–7.4)
NEUTROPHILS NFR BLD AUTO: 58.9 % — SIGNIFICANT CHANGE UP (ref 43–77)
NRBC # BLD: 0 /100 WBCS — SIGNIFICANT CHANGE UP (ref 0–0)
PLATELET # BLD AUTO: 199 K/UL — SIGNIFICANT CHANGE UP (ref 150–400)
POTASSIUM SERPL-MCNC: 3.5 MMOL/L — SIGNIFICANT CHANGE UP (ref 3.5–5.3)
POTASSIUM SERPL-SCNC: 3.5 MMOL/L — SIGNIFICANT CHANGE UP (ref 3.5–5.3)
PROT SERPL-MCNC: 7.9 G/DL — SIGNIFICANT CHANGE UP (ref 6–8.3)
PROTHROM AB SERPL-ACNC: 11 SEC — SIGNIFICANT CHANGE UP (ref 9.5–13)
RBC # BLD: 4.07 M/UL — SIGNIFICANT CHANGE UP (ref 3.8–5.2)
RBC # FLD: 12.3 % — SIGNIFICANT CHANGE UP (ref 10.3–14.5)
SODIUM SERPL-SCNC: 138 MMOL/L — SIGNIFICANT CHANGE UP (ref 135–145)
TROPONIN T, HIGH SENSITIVITY RESULT: <6 NG/L — SIGNIFICANT CHANGE UP (ref 0–51)
WBC # BLD: 6.32 K/UL — SIGNIFICANT CHANGE UP (ref 3.8–10.5)
WBC # FLD AUTO: 6.32 K/UL — SIGNIFICANT CHANGE UP (ref 3.8–10.5)

## 2024-07-27 PROCEDURE — 71046 X-RAY EXAM CHEST 2 VIEWS: CPT | Mod: 26

## 2024-07-27 PROCEDURE — 83605 ASSAY OF LACTIC ACID: CPT

## 2024-07-27 PROCEDURE — 85014 HEMATOCRIT: CPT

## 2024-07-27 PROCEDURE — 99284 EMERGENCY DEPT VISIT MOD MDM: CPT | Mod: 25

## 2024-07-27 PROCEDURE — 85610 PROTHROMBIN TIME: CPT

## 2024-07-27 PROCEDURE — 71046 X-RAY EXAM CHEST 2 VIEWS: CPT

## 2024-07-27 PROCEDURE — 82947 ASSAY GLUCOSE BLOOD QUANT: CPT

## 2024-07-27 PROCEDURE — 93005 ELECTROCARDIOGRAM TRACING: CPT

## 2024-07-27 PROCEDURE — 85730 THROMBOPLASTIN TIME PARTIAL: CPT

## 2024-07-27 PROCEDURE — 85025 COMPLETE CBC W/AUTO DIFF WBC: CPT

## 2024-07-27 PROCEDURE — 82330 ASSAY OF CALCIUM: CPT

## 2024-07-27 PROCEDURE — 82803 BLOOD GASES ANY COMBINATION: CPT

## 2024-07-27 PROCEDURE — 36000 PLACE NEEDLE IN VEIN: CPT

## 2024-07-27 PROCEDURE — 84295 ASSAY OF SERUM SODIUM: CPT

## 2024-07-27 PROCEDURE — 84132 ASSAY OF SERUM POTASSIUM: CPT

## 2024-07-27 PROCEDURE — 85379 FIBRIN DEGRADATION QUANT: CPT

## 2024-07-27 PROCEDURE — 84702 CHORIONIC GONADOTROPIN TEST: CPT

## 2024-07-27 PROCEDURE — 84484 ASSAY OF TROPONIN QUANT: CPT

## 2024-07-27 PROCEDURE — 85018 HEMOGLOBIN: CPT

## 2024-07-27 PROCEDURE — 99285 EMERGENCY DEPT VISIT HI MDM: CPT | Mod: 25

## 2024-07-27 PROCEDURE — 82435 ASSAY OF BLOOD CHLORIDE: CPT

## 2024-07-27 PROCEDURE — 80053 COMPREHEN METABOLIC PANEL: CPT

## 2024-07-27 NOTE — ED ADULT NURSE NOTE - NSFALLUNIVINTERV_ED_ALL_ED
Bed/Stretcher in lowest position, wheels locked, appropriate side rails in place/Call bell, personal items and telephone in reach/Instruct patient to call for assistance before getting out of bed/chair/stretcher/Non-slip footwear applied when patient is off stretcher/[place] to call system/Physically safe environment - no spills, clutter or unnecessary equipment/Purposeful proactive rounding/Room/bathroom lighting operational, light cord in reach

## 2024-07-27 NOTE — ED PROVIDER NOTE - PATIENT PORTAL LINK FT
You can access the FollowMyHealth Patient Portal offered by [facility] by registering at the following website: [email or web address]. By joining [employer]’s FollowMyHealth portal, you will also be able to view your health information using other applications (apps) compatible with our system.

## 2024-07-27 NOTE — ED ADULT NURSE NOTE - OBJECTIVE STATEMENT
27 yo Female from home with PMH newly diagnosed fibromyalgia presents to the ED with report of new onset cough that began tonight with production of bloody mucus flesh. Upon assessment, pt is A&Ox4, speaking in full coherent sentences, +chest pressure, no SOB, abd soft and nontender upon palpation, pt moving upper and lower extremities without difficulty. Pt denies recent sick or ill contacts. IV placed. Labs sent. Pt endorsing chills at this time, afebrile orally. Bed locked in lowest position, safety and comfort measures maintained.

## 2024-07-27 NOTE — ED PROVIDER NOTE - PHYSICAL EXAMINATION
Gen: No acute distress  HEENT: EOMI, no nasal discharge, mucous membranes moist  CV: RRR, +S1/S2, no M/R/G, 2+ radial pulses b/l  Resp: CTAB, no W/R/R, no accessory muscle use, no increased work of breathing  GI: Abdomen soft non-distended, NTTP  MSK: No open wounds, no bruising, no LE edema  Neuro: A&Ox4, following commands, moving all four extremities spontaneously  Psych: appropriate mood

## 2024-07-27 NOTE — ED PROVIDER NOTE - CLINICAL SUMMARY MEDICAL DECISION MAKING FREE TEXT BOX
Isaac Thomas MD, PGY3  28-year-old female with newly diagnosed fibromyalgia presenting to emergency department with cough, chest pain, shortness of breath, episode of hemoptysis that occurred prior to coming to emergency department today.  Of note patient also recently had tooth extraction on Tuesday for which she is currently taking amoxicillin.  States she has had intermittent chest pain, shortness of breath over the past few months which specialists are attributing to possible fibromyalgia however had episode of hemoptysis in which she states she coughed up a small blood clot tonight which prompted visit to emergency department.  Has not taken any medications for symptoms.  Denies fever, headache, vision change, abdominal pain, nausea, vomiting, diarrhea, extremity edema, rash.    In the emergency department patient is hemodynamically stable, afebrile.  Patient well-appearing in no acute distress.  On exam patient has normal heart sounds, clear lungs, abdomen soft nontender nondistended.  Patient does have swelling to right mandible which patient states has been present since dental procedure.  Oropharynx clear.  Differential including but not limited to bronchitis, pneumonia, ACS, pulmonary embolism, anemia, electrolyte abnormalities.  Plan to obtain labs, chest x-ray, EKG, put on cardiac monitor.  Will send D-dimer.  Discussed with patient that if D-dimer is positive she will likely require a CTA of her chest.  Disposition pending workup, will reassess.

## 2024-07-27 NOTE — ED PROVIDER NOTE - PROGRESS NOTE DETAILS
Isaac Thomas MD, PGY3  Labs and imaging nonactionable.  Patient not endorsing any chest pain or shortness of breath at this time.  Will DC home.  Patient in agreement with plan.  Answered all questions and gave return precautions.

## 2024-07-27 NOTE — ED ADULT TRIAGE NOTE - SPO2 (%)
[Normal] : normal rate, regular rhythm, normal S1 and S2 and no murmur heard [No Varicosities] : no varicosities [Pedal Pulses Present] : the pedal pulses are present 98 [No Edema] : there was no peripheral edema [Soft] : abdomen soft [Non Tender] : non-tender [Non-distended] : non-distended [No CVA Tenderness] : no CVA  tenderness [No Rash] : no rash

## 2024-07-27 NOTE — ED ADULT NURSE NOTE - ABDOMEN
Is the patient due for a refill? Yes    Was the patient seen the past year? Yes    Date of last office visit: 6/20/22    Does the patient have an upcoming appointment?  Yes   If yes, When? 12/20/22    Provider to refill:MAN    Does the patients insurance require a 100 day supply?  No     0 soft/nondistended/nontender

## 2024-07-27 NOTE — ED PROVIDER NOTE - ATTENDING CONTRIBUTION TO CARE
MD Garcia:  patient seen and evaluated personally.   I agree with the History & Physical,  Impression & Plan other than what was detailed in my note.  MD Garcia  28-year-old female history of asthma, recent diagnosis of fibromyalgia presented to the emergency department with chief complaint of cough.  Patient states has been going on for the past couple days.  She has been having chest pain when coughing.  She feels short of breath when ambulating.  She is not have any lower extremity swelling.  She has no history of DVT or PE.  She is coming in today because she was concerned that she did notice some blood that was mixed in with the mucus earlier today.  She showed me a picture of it.  She is having some general malaise otherwise feeling well.  Afebrile, vitals are stable she is nontoxic and well-appearing, mucous membranes appear moist.  ENT exam is unremarkable.  Lungs are clear to auscultation bilaterally no wheezing rales or rhonchi, abdomen soft nontender,  heart sounds are normal with no murmurs rubs or gallops.  Extremities with no signs or symptoms consistent with DVT.  Seems most likely bronchitis however given the hemoptysis cannot PERC patient.  Will get some screening EKG labs, D-dimer rule out DVT/PE, chest x-ray to screen for pneumonia, reevaluate.

## 2024-07-30 ENCOUNTER — NON-APPOINTMENT (OUTPATIENT)
Age: 29
End: 2024-07-30

## 2024-08-18 ENCOUNTER — NON-APPOINTMENT (OUTPATIENT)
Age: 29
End: 2024-08-18

## 2024-09-14 ENCOUNTER — NON-APPOINTMENT (OUTPATIENT)
Age: 29
End: 2024-09-14

## 2025-01-31 ENCOUNTER — NON-APPOINTMENT (OUTPATIENT)
Age: 30
End: 2025-01-31

## 2025-02-07 NOTE — ED PROVIDER NOTE - OBJECTIVE STATEMENT
done 26 y F, hx of IBS, presenting with 2-days onset of L upper dental pain. Patient reports that she had a temporary filling placed two weeks ago by Dr. Garrison Weir. Ate some carrots two days ago and felt like the filling is now pressing against the nerve. Spoke with the dentist and was recommended to come to the ER for evaluation. Denies fever, swelling.

## 2025-02-26 ENCOUNTER — NON-APPOINTMENT (OUTPATIENT)
Age: 30
End: 2025-02-26

## 2025-06-14 ENCOUNTER — NON-APPOINTMENT (OUTPATIENT)
Age: 30
End: 2025-06-14